# Patient Record
Sex: MALE | Race: BLACK OR AFRICAN AMERICAN | NOT HISPANIC OR LATINO
[De-identification: names, ages, dates, MRNs, and addresses within clinical notes are randomized per-mention and may not be internally consistent; named-entity substitution may affect disease eponyms.]

---

## 2017-01-12 ENCOUNTER — MEDICATION RENEWAL (OUTPATIENT)
Age: 82
End: 2017-01-12

## 2017-01-24 ENCOUNTER — APPOINTMENT (OUTPATIENT)
Dept: FAMILY MEDICINE | Facility: CLINIC | Age: 82
End: 2017-01-24

## 2017-01-24 ENCOUNTER — RESULT CHARGE (OUTPATIENT)
Age: 82
End: 2017-01-24

## 2017-01-24 VITALS
OXYGEN SATURATION: 95 % | SYSTOLIC BLOOD PRESSURE: 114 MMHG | DIASTOLIC BLOOD PRESSURE: 70 MMHG | HEIGHT: 72 IN | WEIGHT: 194 LBS | HEART RATE: 97 BPM | BODY MASS INDEX: 26.28 KG/M2 | TEMPERATURE: 97.9 F

## 2017-02-07 ENCOUNTER — MEDICATION RENEWAL (OUTPATIENT)
Age: 82
End: 2017-02-07

## 2017-02-07 LAB
25(OH)D3 SERPL-MCNC: 13 NG/ML
ALBUMIN SERPL ELPH-MCNC: 4 G/DL
ALP BLD-CCNC: 54 U/L
ALT SERPL-CCNC: 29 U/L
ANION GAP SERPL CALC-SCNC: 15 MMOL/L
AST SERPL-CCNC: 26 U/L
BILIRUB SERPL-MCNC: 0.3 MG/DL
BUN SERPL-MCNC: 18 MG/DL
CALCIUM SERPL-MCNC: 9.8 MG/DL
CHLORIDE SERPL-SCNC: 100 MMOL/L
CO2 SERPL-SCNC: 24 MMOL/L
CREAT SERPL-MCNC: 1.16 MG/DL
GLUCOSE SERPL-MCNC: 181 MG/DL
HBA1C MFR BLD HPLC: 7.8
INR PPP: 1.79 RATIO
POTASSIUM SERPL-SCNC: 4.1 MMOL/L
PROT SERPL-MCNC: 8.3 G/DL
PT BLD: 20.4 SEC
SODIUM SERPL-SCNC: 139 MMOL/L

## 2017-02-09 ENCOUNTER — APPOINTMENT (OUTPATIENT)
Dept: RADIOLOGY | Facility: CLINIC | Age: 82
End: 2017-02-09

## 2017-02-14 ENCOUNTER — APPOINTMENT (OUTPATIENT)
Dept: FAMILY MEDICINE | Facility: CLINIC | Age: 82
End: 2017-02-14

## 2017-02-14 ENCOUNTER — FORM ENCOUNTER (OUTPATIENT)
Age: 82
End: 2017-02-14

## 2017-02-14 VITALS
HEIGHT: 72 IN | WEIGHT: 198 LBS | BODY MASS INDEX: 26.82 KG/M2 | TEMPERATURE: 97 F | HEART RATE: 88 BPM | SYSTOLIC BLOOD PRESSURE: 164 MMHG | DIASTOLIC BLOOD PRESSURE: 78 MMHG | OXYGEN SATURATION: 95 %

## 2017-02-15 ENCOUNTER — APPOINTMENT (OUTPATIENT)
Dept: RADIOLOGY | Facility: CLINIC | Age: 82
End: 2017-02-15

## 2017-02-15 ENCOUNTER — OUTPATIENT (OUTPATIENT)
Dept: OUTPATIENT SERVICES | Facility: HOSPITAL | Age: 82
LOS: 1 days | End: 2017-02-15
Payer: MEDICARE

## 2017-02-15 DIAGNOSIS — Z00.8 ENCOUNTER FOR OTHER GENERAL EXAMINATION: ICD-10-CM

## 2017-02-15 PROCEDURE — 77080 DXA BONE DENSITY AXIAL: CPT

## 2017-02-16 LAB
INR PPP: 4.88 RATIO
PT BLD: 56 SEC

## 2017-02-23 ENCOUNTER — MEDICATION RENEWAL (OUTPATIENT)
Age: 82
End: 2017-02-23

## 2017-02-23 DIAGNOSIS — H91.92 UNSPECIFIED HEARING LOSS, LEFT EAR: ICD-10-CM

## 2017-02-23 RX ORDER — AMOXICILLIN 500 MG/1
500 TABLET, FILM COATED ORAL
Qty: 10 | Refills: 0 | Status: COMPLETED | COMMUNITY
Start: 2017-01-24 | End: 2017-02-23

## 2017-02-23 RX ORDER — GUAIFENESIN AND DEXTROMETHORPHAN HYDROBROMIDE 200; 10 MG/5ML; MG/5ML
10-200 LIQUID ORAL
Qty: 1 | Refills: 1 | Status: COMPLETED | COMMUNITY
Start: 2017-01-24 | End: 2017-02-23

## 2017-03-02 ENCOUNTER — APPOINTMENT (OUTPATIENT)
Dept: FAMILY MEDICINE | Facility: CLINIC | Age: 82
End: 2017-03-02

## 2017-03-02 VITALS
TEMPERATURE: 97 F | BODY MASS INDEX: 26.68 KG/M2 | WEIGHT: 197 LBS | SYSTOLIC BLOOD PRESSURE: 169 MMHG | DIASTOLIC BLOOD PRESSURE: 74 MMHG | HEIGHT: 72 IN | HEART RATE: 89 BPM | OXYGEN SATURATION: 95 %

## 2017-03-10 LAB
INR PPP: 1.55 RATIO
PT BLD: 17.6 SEC

## 2017-04-19 ENCOUNTER — MEDICATION RENEWAL (OUTPATIENT)
Age: 82
End: 2017-04-19

## 2017-05-12 ENCOUNTER — APPOINTMENT (OUTPATIENT)
Dept: FAMILY MEDICINE | Facility: CLINIC | Age: 82
End: 2017-05-12

## 2017-05-12 VITALS
DIASTOLIC BLOOD PRESSURE: 68 MMHG | BODY MASS INDEX: 26.45 KG/M2 | HEART RATE: 92 BPM | OXYGEN SATURATION: 94 % | SYSTOLIC BLOOD PRESSURE: 143 MMHG | WEIGHT: 195 LBS | TEMPERATURE: 98 F

## 2017-05-13 LAB
HBA1C MFR BLD HPLC: 7.9
INR PPP: 1.76 RATIO
PT BLD: 20.1 SEC

## 2017-06-19 ENCOUNTER — FORM ENCOUNTER (OUTPATIENT)
Age: 82
End: 2017-06-19

## 2017-06-19 ENCOUNTER — RESULT CHARGE (OUTPATIENT)
Age: 82
End: 2017-06-19

## 2017-06-20 ENCOUNTER — APPOINTMENT (OUTPATIENT)
Dept: RADIOLOGY | Facility: CLINIC | Age: 82
End: 2017-06-20

## 2017-06-20 ENCOUNTER — APPOINTMENT (OUTPATIENT)
Dept: FAMILY MEDICINE | Facility: CLINIC | Age: 82
End: 2017-06-20

## 2017-06-20 ENCOUNTER — OUTPATIENT (OUTPATIENT)
Dept: OUTPATIENT SERVICES | Facility: HOSPITAL | Age: 82
LOS: 1 days | End: 2017-06-20
Payer: MEDICARE

## 2017-06-20 VITALS
TEMPERATURE: 97.7 F | DIASTOLIC BLOOD PRESSURE: 84 MMHG | OXYGEN SATURATION: 95 % | SYSTOLIC BLOOD PRESSURE: 168 MMHG | WEIGHT: 191 LBS | HEIGHT: 72 IN | HEART RATE: 110 BPM | BODY MASS INDEX: 25.87 KG/M2

## 2017-06-20 DIAGNOSIS — J40 BRONCHITIS, NOT SPECIFIED AS ACUTE OR CHRONIC: ICD-10-CM

## 2017-06-20 PROCEDURE — 71020: CPT | Mod: 26

## 2017-06-20 PROCEDURE — 71046 X-RAY EXAM CHEST 2 VIEWS: CPT

## 2017-06-22 LAB
INR PPP: 1.38 RATIO
PT BLD: 15.7 SEC

## 2017-06-28 LAB
INR PPP: 2.89 RATIO
PT BLD: 33.4 SEC

## 2017-07-28 ENCOUNTER — MEDICATION RENEWAL (OUTPATIENT)
Age: 82
End: 2017-07-28

## 2017-08-01 ENCOUNTER — APPOINTMENT (OUTPATIENT)
Dept: FAMILY MEDICINE | Facility: CLINIC | Age: 82
End: 2017-08-01

## 2017-08-13 ENCOUNTER — RESULT CHARGE (OUTPATIENT)
Age: 82
End: 2017-08-13

## 2017-08-14 ENCOUNTER — APPOINTMENT (OUTPATIENT)
Dept: FAMILY MEDICINE | Facility: CLINIC | Age: 82
End: 2017-08-14
Payer: MEDICARE

## 2017-08-14 VITALS
BODY MASS INDEX: 25.19 KG/M2 | HEIGHT: 72 IN | OXYGEN SATURATION: 95 % | HEART RATE: 86 BPM | DIASTOLIC BLOOD PRESSURE: 63 MMHG | TEMPERATURE: 97.7 F | SYSTOLIC BLOOD PRESSURE: 130 MMHG | WEIGHT: 186 LBS

## 2017-08-14 DIAGNOSIS — M25.569 PAIN IN UNSPECIFIED KNEE: ICD-10-CM

## 2017-08-14 DIAGNOSIS — Z87.09 PERSONAL HISTORY OF OTHER DISEASES OF THE RESPIRATORY SYSTEM: ICD-10-CM

## 2017-08-14 DIAGNOSIS — Z92.29 PERSONAL HISTORY OF OTHER DRUG THERAPY: ICD-10-CM

## 2017-08-14 DIAGNOSIS — Z23 ENCOUNTER FOR IMMUNIZATION: ICD-10-CM

## 2017-08-14 DIAGNOSIS — J22 UNSPECIFIED ACUTE LOWER RESPIRATORY INFECTION: ICD-10-CM

## 2017-08-14 PROCEDURE — 36415 COLL VENOUS BLD VENIPUNCTURE: CPT

## 2017-08-14 PROCEDURE — 99213 OFFICE O/P EST LOW 20 MIN: CPT | Mod: 25

## 2017-08-14 PROCEDURE — 93000 ELECTROCARDIOGRAM COMPLETE: CPT

## 2017-08-14 RX ORDER — GUAIFENESIN AND DEXTROMETHORPHAN HYDROBROMIDE 200; 10 MG/5ML; MG/5ML
10-200 LIQUID ORAL
Qty: 1 | Refills: 1 | Status: COMPLETED | COMMUNITY
Start: 2017-06-20 | End: 2017-08-14

## 2017-08-14 RX ORDER — AZITHROMYCIN DIHYDRATE 250 MG/1
250 TABLET, FILM COATED ORAL
Qty: 1 | Refills: 0 | Status: COMPLETED | COMMUNITY
Start: 2017-06-20 | End: 2017-08-14

## 2017-08-15 LAB
BASOPHILS # BLD AUTO: 0.03 K/UL
BASOPHILS NFR BLD AUTO: 0.5 %
EOSINOPHIL # BLD AUTO: 0.2 K/UL
EOSINOPHIL NFR BLD AUTO: 3.1 %
HBA1C MFR BLD HPLC: 9.1
HCT VFR BLD CALC: 34.2 %
HGB BLD-MCNC: 11.2 G/DL
IMM GRANULOCYTES NFR BLD AUTO: 0.2 %
INR PPP: 3.3 RATIO
LYMPHOCYTES # BLD AUTO: 3.02 K/UL
LYMPHOCYTES NFR BLD AUTO: 46.2 %
MAN DIFF?: NORMAL
MCHC RBC-ENTMCNC: 28.6 PG
MCHC RBC-ENTMCNC: 32.7 GM/DL
MCV RBC AUTO: 87.2 FL
MONOCYTES # BLD AUTO: 0.54 K/UL
MONOCYTES NFR BLD AUTO: 8.3 %
NEUTROPHILS # BLD AUTO: 2.74 K/UL
NEUTROPHILS NFR BLD AUTO: 41.7 %
PLATELET # BLD AUTO: 232 K/UL
PT BLD: 38.2 SEC
RBC # BLD: 3.92 M/UL
RBC # FLD: 14.8 %
WBC # FLD AUTO: 6.54 K/UL

## 2017-09-05 ENCOUNTER — MEDICATION RENEWAL (OUTPATIENT)
Age: 82
End: 2017-09-05

## 2017-09-19 ENCOUNTER — MED ADMIN CHARGE (OUTPATIENT)
Age: 82
End: 2017-09-19

## 2017-09-19 ENCOUNTER — APPOINTMENT (OUTPATIENT)
Dept: FAMILY MEDICINE | Facility: CLINIC | Age: 82
End: 2017-09-19
Payer: MEDICARE

## 2017-09-19 VITALS
HEART RATE: 87 BPM | SYSTOLIC BLOOD PRESSURE: 168 MMHG | OXYGEN SATURATION: 96 % | DIASTOLIC BLOOD PRESSURE: 74 MMHG | TEMPERATURE: 98.1 F | BODY MASS INDEX: 25.19 KG/M2 | HEIGHT: 72 IN | WEIGHT: 186 LBS

## 2017-09-19 DIAGNOSIS — Z00.00 ENCOUNTER FOR GENERAL ADULT MEDICAL EXAMINATION W/OUT ABNORMAL FINDINGS: ICD-10-CM

## 2017-09-19 PROCEDURE — 90662 IIV NO PRSV INCREASED AG IM: CPT

## 2017-09-19 PROCEDURE — 93000 ELECTROCARDIOGRAM COMPLETE: CPT

## 2017-09-19 PROCEDURE — G0008: CPT

## 2017-09-19 PROCEDURE — G0439: CPT

## 2017-09-19 PROCEDURE — 36415 COLL VENOUS BLD VENIPUNCTURE: CPT

## 2017-09-19 RX ORDER — ACETAMINOPHEN 500 MG/1
500 TABLET ORAL TWICE DAILY
Qty: 1 | Refills: 2 | Status: COMPLETED | COMMUNITY
Start: 2017-05-12 | End: 2017-09-19

## 2017-09-20 LAB
25(OH)D3 SERPL-MCNC: 33.2 NG/ML
ALBUMIN SERPL ELPH-MCNC: 3.9 G/DL
ALP BLD-CCNC: 50 U/L
ALT SERPL-CCNC: 14 U/L
ANION GAP SERPL CALC-SCNC: 16 MMOL/L
APPEARANCE: CLEAR
AST SERPL-CCNC: 21 U/L
BACTERIA: NEGATIVE
BASOPHILS # BLD AUTO: 0.03 K/UL
BASOPHILS NFR BLD AUTO: 0.4 %
BILIRUB SERPL-MCNC: 0.2 MG/DL
BILIRUBIN URINE: NEGATIVE
BLOOD URINE: NEGATIVE
BUN SERPL-MCNC: 11 MG/DL
CALCIUM SERPL-MCNC: 10.1 MG/DL
CHLORIDE SERPL-SCNC: 100 MMOL/L
CHOLEST SERPL-MCNC: 185 MG/DL
CHOLEST/HDLC SERPL: 3.8 RATIO
CO2 SERPL-SCNC: 23 MMOL/L
COLOR: YELLOW
CREAT SERPL-MCNC: 1.03 MG/DL
CREAT SPEC-SCNC: 145 MG/DL
EOSINOPHIL # BLD AUTO: 0.3 K/UL
EOSINOPHIL NFR BLD AUTO: 4.4 %
GLUCOSE QUALITATIVE U: NORMAL MG/DL
GLUCOSE SERPL-MCNC: 184 MG/DL
HCT VFR BLD CALC: 34.3 %
HCV AB SER QL: NONREACTIVE
HCV S/CO RATIO: 0.31 S/CO
HDLC SERPL-MCNC: 49 MG/DL
HGB BLD-MCNC: 11.4 G/DL
HIV1+2 AB SPEC QL IA.RAPID: NONREACTIVE
HYALINE CASTS: 0 /LPF
IMM GRANULOCYTES NFR BLD AUTO: 0.1 %
INR PPP: 1.96 RATIO
KETONES URINE: NEGATIVE
LDLC SERPL CALC-MCNC: 108 MG/DL
LEUKOCYTE ESTERASE URINE: NEGATIVE
LYMPHOCYTES # BLD AUTO: 2.75 K/UL
LYMPHOCYTES NFR BLD AUTO: 40.6 %
MAN DIFF?: NORMAL
MCHC RBC-ENTMCNC: 28.9 PG
MCHC RBC-ENTMCNC: 33.2 GM/DL
MCV RBC AUTO: 86.8 FL
MICROALBUMIN 24H UR DL<=1MG/L-MCNC: 36.5 MG/DL
MICROALBUMIN/CREAT 24H UR-RTO: 252 MG/G
MICROSCOPIC-UA: NORMAL
MONOCYTES # BLD AUTO: 0.51 K/UL
MONOCYTES NFR BLD AUTO: 7.5 %
NEUTROPHILS # BLD AUTO: 3.18 K/UL
NEUTROPHILS NFR BLD AUTO: 47 %
NITRITE URINE: NEGATIVE
PH URINE: 6.5
PLATELET # BLD AUTO: 215 K/UL
POTASSIUM SERPL-SCNC: 4.1 MMOL/L
PROT SERPL-MCNC: 8.7 G/DL
PROTEIN URINE: 100 MG/DL
PSA SERPL-MCNC: 0.82 NG/ML
PT BLD: 22.5 SEC
RBC # BLD: 3.95 M/UL
RBC # FLD: 14.9 %
RED BLOOD CELLS URINE: 1 /HPF
SODIUM SERPL-SCNC: 139 MMOL/L
SPECIFIC GRAVITY URINE: 1.02
SQUAMOUS EPITHELIAL CELLS: 1 /HPF
TRIGL SERPL-MCNC: 142 MG/DL
TSH SERPL-ACNC: 1.61 UIU/ML
UROBILINOGEN URINE: NORMAL MG/DL
WBC # FLD AUTO: 6.78 K/UL
WHITE BLOOD CELLS URINE: 1 /HPF

## 2017-11-01 ENCOUNTER — MEDICATION RENEWAL (OUTPATIENT)
Age: 82
End: 2017-11-01

## 2017-12-21 ENCOUNTER — MEDICATION RENEWAL (OUTPATIENT)
Age: 82
End: 2017-12-21

## 2018-01-06 ENCOUNTER — MEDICATION RENEWAL (OUTPATIENT)
Age: 83
End: 2018-01-06

## 2018-01-06 ENCOUNTER — MOBILE ON CALL (OUTPATIENT)
Age: 83
End: 2018-01-06

## 2018-01-29 ENCOUNTER — RESULT CHARGE (OUTPATIENT)
Age: 83
End: 2018-01-29

## 2018-01-30 ENCOUNTER — APPOINTMENT (OUTPATIENT)
Dept: FAMILY MEDICINE | Facility: CLINIC | Age: 83
End: 2018-01-30
Payer: MEDICARE

## 2018-01-30 VITALS
OXYGEN SATURATION: 96 % | WEIGHT: 191 LBS | HEIGHT: 72 IN | TEMPERATURE: 97.8 F | SYSTOLIC BLOOD PRESSURE: 173 MMHG | BODY MASS INDEX: 25.87 KG/M2 | HEART RATE: 84 BPM | DIASTOLIC BLOOD PRESSURE: 78 MMHG

## 2018-01-30 DIAGNOSIS — R07.89 OTHER CHEST PAIN: ICD-10-CM

## 2018-01-30 DIAGNOSIS — N39.41 URGE INCONTINENCE: ICD-10-CM

## 2018-01-30 DIAGNOSIS — Z92.29 PERSONAL HISTORY OF OTHER DRUG THERAPY: ICD-10-CM

## 2018-01-30 PROCEDURE — 36415 COLL VENOUS BLD VENIPUNCTURE: CPT

## 2018-01-30 PROCEDURE — 99213 OFFICE O/P EST LOW 20 MIN: CPT | Mod: 25

## 2018-02-02 LAB
BASOPHILS # BLD AUTO: 0.04 K/UL
BASOPHILS NFR BLD AUTO: 0.5 %
EOSINOPHIL # BLD AUTO: 0.35 K/UL
EOSINOPHIL NFR BLD AUTO: 4.2 %
HBA1C MFR BLD HPLC: 7.8
HCT VFR BLD CALC: 35.2 %
HGB BLD-MCNC: 11.6 G/DL
IMM GRANULOCYTES NFR BLD AUTO: 0 %
INR PPP: 3.19 RATIO
LYMPHOCYTES # BLD AUTO: 3.94 K/UL
LYMPHOCYTES NFR BLD AUTO: 46.8 %
MAN DIFF?: NORMAL
MCHC RBC-ENTMCNC: 28.9 PG
MCHC RBC-ENTMCNC: 33 GM/DL
MCV RBC AUTO: 87.6 FL
MONOCYTES # BLD AUTO: 0.54 K/UL
MONOCYTES NFR BLD AUTO: 6.4 %
NEUTROPHILS # BLD AUTO: 3.55 K/UL
NEUTROPHILS NFR BLD AUTO: 42.1 %
PLATELET # BLD AUTO: 235 K/UL
PT BLD: 36.9 SEC
RBC # BLD: 4.02 M/UL
RBC # FLD: 14.5 %
WBC # FLD AUTO: 8.42 K/UL

## 2018-04-04 ENCOUNTER — RX RENEWAL (OUTPATIENT)
Age: 83
End: 2018-04-04

## 2018-04-30 ENCOUNTER — APPOINTMENT (OUTPATIENT)
Dept: FAMILY MEDICINE | Facility: CLINIC | Age: 83
End: 2018-04-30
Payer: MEDICARE

## 2018-04-30 ENCOUNTER — RESULT CHARGE (OUTPATIENT)
Age: 83
End: 2018-04-30

## 2018-04-30 VITALS
WEIGHT: 190 LBS | DIASTOLIC BLOOD PRESSURE: 73 MMHG | BODY MASS INDEX: 25.73 KG/M2 | HEIGHT: 72 IN | OXYGEN SATURATION: 92 % | HEART RATE: 89 BPM | TEMPERATURE: 98.2 F | SYSTOLIC BLOOD PRESSURE: 148 MMHG

## 2018-04-30 LAB — HBA1C MFR BLD HPLC: 8.1

## 2018-04-30 PROCEDURE — 99214 OFFICE O/P EST MOD 30 MIN: CPT

## 2018-04-30 RX ORDER — OMEPRAZOLE 40 MG/1
40 CAPSULE, DELAYED RELEASE ORAL
Qty: 30 | Refills: 0 | Status: COMPLETED | COMMUNITY
Start: 2017-08-14 | End: 2018-04-30

## 2018-05-01 LAB
25(OH)D3 SERPL-MCNC: 29.8 NG/ML
ALBUMIN SERPL ELPH-MCNC: 4 G/DL
ALP BLD-CCNC: 49 U/L
ALT SERPL-CCNC: 13 U/L
ANION GAP SERPL CALC-SCNC: 14 MMOL/L
AST SERPL-CCNC: 16 U/L
BILIRUB SERPL-MCNC: 0.2 MG/DL
BUN SERPL-MCNC: 22 MG/DL
CALCIUM SERPL-MCNC: 10.7 MG/DL
CHLORIDE SERPL-SCNC: 102 MMOL/L
CO2 SERPL-SCNC: 22 MMOL/L
CREAT SERPL-MCNC: 1.37 MG/DL
GLUCOSE SERPL-MCNC: 201 MG/DL
INR PPP: 2.3 RATIO
POTASSIUM SERPL-SCNC: 4.9 MMOL/L
PROT SERPL-MCNC: 8.4 G/DL
PT BLD: 26.4 SEC
SODIUM SERPL-SCNC: 138 MMOL/L

## 2018-06-13 ENCOUNTER — MEDICATION RENEWAL (OUTPATIENT)
Age: 83
End: 2018-06-13

## 2018-07-20 ENCOUNTER — RX RENEWAL (OUTPATIENT)
Age: 83
End: 2018-07-20

## 2018-07-20 ENCOUNTER — MEDICATION RENEWAL (OUTPATIENT)
Age: 83
End: 2018-07-20

## 2018-07-23 ENCOUNTER — RX RENEWAL (OUTPATIENT)
Age: 83
End: 2018-07-23

## 2018-07-30 ENCOUNTER — MED ADMIN CHARGE (OUTPATIENT)
Age: 83
End: 2018-07-30

## 2018-07-30 ENCOUNTER — RESULT CHARGE (OUTPATIENT)
Age: 83
End: 2018-07-30

## 2018-07-30 ENCOUNTER — APPOINTMENT (OUTPATIENT)
Dept: FAMILY MEDICINE | Facility: CLINIC | Age: 83
End: 2018-07-30
Payer: MEDICARE

## 2018-07-30 VITALS
HEIGHT: 72 IN | DIASTOLIC BLOOD PRESSURE: 70 MMHG | OXYGEN SATURATION: 95 % | BODY MASS INDEX: 25.19 KG/M2 | WEIGHT: 186 LBS | TEMPERATURE: 98.1 F | SYSTOLIC BLOOD PRESSURE: 137 MMHG | HEART RATE: 97 BPM

## 2018-07-30 DIAGNOSIS — Z13.89 ENCOUNTER FOR SCREENING FOR OTHER DISORDER: ICD-10-CM

## 2018-07-30 DIAGNOSIS — N52.9 MALE ERECTILE DYSFUNCTION, UNSPECIFIED: ICD-10-CM

## 2018-07-30 DIAGNOSIS — M85.80 OTHER SPECIFIED DISORDERS OF BONE DENSITY AND STRUCTURE, UNSPECIFIED SITE: ICD-10-CM

## 2018-07-30 DIAGNOSIS — E55.9 VITAMIN D DEFICIENCY, UNSPECIFIED: ICD-10-CM

## 2018-07-30 DIAGNOSIS — R79.89 OTHER SPECIFIED ABNORMAL FINDINGS OF BLOOD CHEMISTRY: ICD-10-CM

## 2018-07-30 DIAGNOSIS — R09.89 OTHER SPECIFIED SYMPTOMS AND SIGNS INVOLVING THE CIRCULATORY AND RESPIRATORY SYSTEMS: ICD-10-CM

## 2018-07-30 LAB — HBA1C MFR BLD HPLC: 7.5

## 2018-07-30 PROCEDURE — 99215 OFFICE O/P EST HI 40 MIN: CPT | Mod: 25

## 2018-07-30 PROCEDURE — G0009: CPT

## 2018-07-30 PROCEDURE — 36415 COLL VENOUS BLD VENIPUNCTURE: CPT

## 2018-07-30 PROCEDURE — 83036 HEMOGLOBIN GLYCOSYLATED A1C: CPT | Mod: QW

## 2018-07-30 PROCEDURE — 90732 PPSV23 VACC 2 YRS+ SUBQ/IM: CPT

## 2018-07-30 RX ORDER — ASPIRIN 325 MG
600-400 TABLET, DELAYED RELEASE (ENTERIC COATED) ORAL
Qty: 1 | Refills: 1 | Status: COMPLETED | COMMUNITY
Start: 2017-02-23 | End: 2018-07-30

## 2018-07-30 NOTE — HISTORY OF PRESENT ILLNESS
[FreeTextEntry1] : " I need prescription refills" [de-identified] : this is an 82-year old male with past medical history of paroxysmal  A. fib on anticoagulation (Coumadin), BPH, COPD, type 2 diabetes, erectile dysfunction, hypertension, osteopenia, vitamin D deficiency, presenting to the office for medication refills. Patient offers no acute complaints.

## 2018-07-30 NOTE — ASSESSMENT
[FreeTextEntry1] : this is an 82-her old male with past medical history of paroxysmal  A. fib on anticoagulation (Coumadin), BPH, COPD, type 2 diabetes, erectile dysfunction, hypertension, osteopenia, vitamin D deficiency, presenting to the office for medication refills.\par \par Cardiovascular: History hypertension, paroxysmal A. fib on Coumadin.\par -Will check PT/INR today.\par -Blood pressure under control on current medications, no changes made.\par -Continue amlodipine 5 mg once daily, continue with diltiazem 240 mg once a day, continue ramipril 5 mg once daily, continue warfarin 5 mg daily.\par -Diet and exercise as tolerated has been discussed with the patient.\par \par Endocrine: History of type 2 diabetes, vitamin D insufficiency.\par -Patient's last hemoglobin A1c 7.9. A1c in house today is 7.4\par -Will continue Glipizide 5 mg 2 tablets twice a day.\par -Continue metformin 1000 mg q.12 hours. Continue Onglyza 5 mg once daily.\par -Vitamin D insufficiency, will check vitamin D levels today.\par -Continue to check blood sugars at least once daily.\par \par Rheumatology: History of osteopenia.\par -Continue vitamin D supplementation, continue calcium supplements.\par \par : History of BPH, erectile dysfunction.\par -Continue tamsulosin 0.4 mg daily.\par -Continue Viagra 100 mg one tablet one hour prior to sexual activity.\par -Patient stopped Avodart on his own because he states it makes him feel worse when he takes it.\par \par Health care maintenance:\par -Last bone density done in February 2017.\par -Last colonoscopy screening as per patient October 2015 with no malignant lesions.\par -Patient received flu vaccine in September 2017.\par -Patient received Prevnar 13 in January 2017.\par -Patient received Pneumovax vaccine today.\par -Patient will return to the office in 3 months for a diabetes followup.

## 2018-07-30 NOTE — PLAN
[FreeTextEntry1] : visit checked and reviewed by supervising attending physician Dr Heide Harding M.D.

## 2018-07-30 NOTE — PHYSICAL EXAM
[No Acute Distress] : no acute distress [Well Nourished] : well nourished [Well Developed] : well developed [Well-Appearing] : well-appearing [No JVD] : no jugular venous distention [Supple] : supple [No Lymphadenopathy] : no lymphadenopathy [Thyroid Normal, No Nodules] : the thyroid was normal and there were no nodules present [No Respiratory Distress] : no respiratory distress  [Clear to Auscultation] : lungs were clear to auscultation bilaterally [No Accessory Muscle Use] : no accessory muscle use [Normal Rate] : normal rate  [Regular Rhythm] : with a regular rhythm [Normal S1, S2] : normal S1 and S2 [No Murmur] : no murmur heard [Soft] : abdomen soft [Non Tender] : non-tender [Normal Bowel Sounds] : normal bowel sounds

## 2018-07-31 LAB
ALBUMIN SERPL ELPH-MCNC: 4.1 G/DL
ALP BLD-CCNC: 48 U/L
ALT SERPL-CCNC: 16 U/L
ANION GAP SERPL CALC-SCNC: 15 MMOL/L
AST SERPL-CCNC: 17 U/L
BILIRUB SERPL-MCNC: 0.2 MG/DL
BUN SERPL-MCNC: 17 MG/DL
CALCIUM SERPL-MCNC: 9.8 MG/DL
CHLORIDE SERPL-SCNC: 104 MMOL/L
CO2 SERPL-SCNC: 22 MMOL/L
CREAT SERPL-MCNC: 1.17 MG/DL
GLUCOSE SERPL-MCNC: 77 MG/DL
INR PPP: 3.36 RATIO
POTASSIUM SERPL-SCNC: 4.4 MMOL/L
PROT SERPL-MCNC: 8.2 G/DL
PT BLD: 38.9 SEC
SODIUM SERPL-SCNC: 141 MMOL/L

## 2018-07-31 RX ORDER — SAXAGLIPTIN 5 MG/1
5 TABLET, FILM COATED ORAL
Qty: 30 | Refills: 3 | Status: DISCONTINUED | COMMUNITY
Start: 2017-08-14 | End: 2018-07-31

## 2018-09-03 ENCOUNTER — RESULT CHARGE (OUTPATIENT)
Age: 83
End: 2018-09-03

## 2018-09-04 ENCOUNTER — APPOINTMENT (OUTPATIENT)
Dept: FAMILY MEDICINE | Facility: CLINIC | Age: 83
End: 2018-09-04
Payer: MEDICARE

## 2018-09-04 VITALS
TEMPERATURE: 98.2 F | BODY MASS INDEX: 25.06 KG/M2 | WEIGHT: 185 LBS | SYSTOLIC BLOOD PRESSURE: 137 MMHG | OXYGEN SATURATION: 95 % | HEIGHT: 72 IN | HEART RATE: 93 BPM | DIASTOLIC BLOOD PRESSURE: 64 MMHG

## 2018-09-04 DIAGNOSIS — T14.8XXA OTHER INJURY OF UNSPECIFIED BODY REGION, INITIAL ENCOUNTER: ICD-10-CM

## 2018-09-04 DIAGNOSIS — Z23 ENCOUNTER FOR IMMUNIZATION: ICD-10-CM

## 2018-09-04 LAB — HBA1C MFR BLD HPLC: 7.2

## 2018-09-04 PROCEDURE — 36415 COLL VENOUS BLD VENIPUNCTURE: CPT

## 2018-09-04 PROCEDURE — 83036 HEMOGLOBIN GLYCOSYLATED A1C: CPT | Mod: QW

## 2018-09-04 PROCEDURE — 99214 OFFICE O/P EST MOD 30 MIN: CPT | Mod: 25

## 2018-09-04 RX ORDER — FLUTICASONE PROPIONATE AND SALMETEROL 50; 250 UG/1; UG/1
250-50 POWDER RESPIRATORY (INHALATION) TWICE DAILY
Qty: 1 | Refills: 3 | Status: ACTIVE | COMMUNITY
Start: 2018-01-30

## 2018-09-04 RX ORDER — ALBUTEROL SULFATE 2.5 MG/3ML
(2.5 MG/3ML) SOLUTION RESPIRATORY (INHALATION)
Qty: 1 | Refills: 3 | Status: ACTIVE | COMMUNITY
Start: 2018-01-30

## 2018-09-04 NOTE — HISTORY OF PRESENT ILLNESS
[FreeTextEntry1] : " I went to the emergency room " [de-identified] : patient presents to the office status post ER visit on August 30 secondary to hematoma of his left chest wall. Patient states that he was taking shower and felt a lump in his left chest wall, Flonase "considerable size black area "near his left nipple, patient decided to go to the emergency room. While in the emergency room he was found to have an INR of 4.71 and was told to stop Coumadin until he came to see his PMD. Patient denies any other bleeding areas, no spontaneous ecchymosis, denies any melena, hematochezia or epistaxis. Patient says he has not taken any Coumadin since then.

## 2018-09-04 NOTE — PHYSICAL EXAM
[No Acute Distress] : no acute distress [Well Nourished] : well nourished [Well Developed] : well developed [Well-Appearing] : well-appearing [No Respiratory Distress] : no respiratory distress  [Clear to Auscultation] : lungs were clear to auscultation bilaterally [No Accessory Muscle Use] : no accessory muscle use [Normal Rate] : normal rate  [Regular Rhythm] : with a regular rhythm [Normal S1, S2] : normal S1 and S2 [No Murmur] : no murmur heard [No Edema] : there was no peripheral edema [Soft] : abdomen soft [Non Tender] : non-tender [Normal Bowel Sounds] : normal bowel sounds [de-identified] : there is a pea size lump on the left breast at 4:00, nontender, there is a small area of ecchymosis in the same area. No signs of active bleeding

## 2018-09-04 NOTE — ASSESSMENT
[FreeTextEntry1] : this is an 82-her old male with past medical history of paroxysmal  A. fib on anticoagulation (Coumadin), BPH, COPD, type 2 diabetes, erectile dysfunction, hypertension, osteopenia, vitamin D deficiency, presenting to the office status post ER visit, found with supratherapeutic INR.\par \par Cardiovascular: History hypertension, paroxysmal A. fib on Coumadin, found with supratherapeutic INR.\par -Will check PT/INR today.\par -Patient currently taking 5 mg of Coumadin, he was asked to start 2.5 mg today, will call him tomorrow after INR results are obtained for appropriate dose.\par -Blood pressure under control on current medications, no changes made.\par -Continue amlodipine 5 mg once daily, continue with diltiazem 240 mg once a day, continue ramipril 5 mg once daily.\par -Diet and exercise as tolerated has been discussed with the patient.\par \par Endocrine: History of type 2 diabetes, vitamin D insufficiency.\par -Patient's last hemoglobin A1c 7.4. A1c in house today is 7.2\par -Will continue Glipizide 5 mg 2 tablets twice a day.\par -Continue metformin 1000 mg q.12 hours. Continue Onglyza 5 mg once daily.\par -Vitamin D insufficiency, will check vitamin D levels today.\par -Continue to check blood sugars at least once daily.\par \par Rheumatology: History of osteopenia.\par -Continue vitamin D supplementation, continue calcium supplements.\par \par : History of BPH, erectile dysfunction.\par -Continue tamsulosin 0.4 mg daily.\par -Continue Viagra 100 mg one tablet one hour prior to sexual activity.\par -Patient stopped Avodart on his own.\par \par Health care maintenance:\par -Last bone density done in February 2017.\par -Last colonoscopy screening as per patient October 2015 with no malignant lesions.\par -Patient received flu vaccine in September 2017.\par -Patient received Prevnar 13 in January 2017.\par -Patient received Pneumovax vaccine in July 2018.\par -Patient will return to the office on 10/29 for diabetes followup.

## 2018-09-05 LAB
INR PPP: 1.4 RATIO
PT BLD: 15.9 SEC

## 2018-09-10 ENCOUNTER — APPOINTMENT (OUTPATIENT)
Dept: FAMILY MEDICINE | Facility: CLINIC | Age: 83
End: 2018-09-10
Payer: MEDICARE

## 2018-09-10 PROCEDURE — 36415 COLL VENOUS BLD VENIPUNCTURE: CPT

## 2018-09-10 PROCEDURE — 99212 OFFICE O/P EST SF 10 MIN: CPT | Mod: 25

## 2018-09-10 NOTE — PHYSICAL EXAM
[No Acute Distress] : no acute distress [Well Nourished] : well nourished [Well Developed] : well developed [Well-Appearing] : well-appearing [No Respiratory Distress] : no respiratory distress  [Clear to Auscultation] : lungs were clear to auscultation bilaterally [No Accessory Muscle Use] : no accessory muscle use [Normal Rate] : normal rate  [Regular Rhythm] : with a regular rhythm [Normal S1, S2] : normal S1 and S2 [No Murmur] : no murmur heard

## 2018-09-10 NOTE — REVIEW OF SYSTEMS
[Abdominal Pain] : abdominal pain [Negative] : Gastrointestinal [Melena] : no melena [Hematuria] : no hematuria

## 2018-09-10 NOTE — ASSESSMENT
[FreeTextEntry1] : patient has been on alternating doses of 2.5 mg and 5 mg of Coumadin. We will check PT/INR today and adjust medication scheduled as needed.\par The patient will need to return to the office in 2 weeks for INR check.

## 2018-09-18 ENCOUNTER — RESULT CHARGE (OUTPATIENT)
Age: 83
End: 2018-09-18

## 2018-09-18 ENCOUNTER — APPOINTMENT (OUTPATIENT)
Dept: FAMILY MEDICINE | Facility: CLINIC | Age: 83
End: 2018-09-18
Payer: MEDICARE

## 2018-09-18 VITALS
TEMPERATURE: 98.6 F | WEIGHT: 185 LBS | DIASTOLIC BLOOD PRESSURE: 57 MMHG | HEIGHT: 72 IN | OXYGEN SATURATION: 95 % | BODY MASS INDEX: 25.06 KG/M2 | HEART RATE: 89 BPM | SYSTOLIC BLOOD PRESSURE: 148 MMHG

## 2018-09-18 DIAGNOSIS — Z23 ENCOUNTER FOR IMMUNIZATION: ICD-10-CM

## 2018-09-18 DIAGNOSIS — N39.41 URGE INCONTINENCE: ICD-10-CM

## 2018-09-18 LAB
INR PPP: 1.8 RATIO
PT BLD: 20.6 SEC

## 2018-09-18 PROCEDURE — 81003 URINALYSIS AUTO W/O SCOPE: CPT | Mod: QW

## 2018-09-18 PROCEDURE — 99215 OFFICE O/P EST HI 40 MIN: CPT | Mod: 25

## 2018-09-18 PROCEDURE — 90662 IIV NO PRSV INCREASED AG IM: CPT

## 2018-09-18 PROCEDURE — G0008: CPT

## 2018-09-18 PROCEDURE — 36415 COLL VENOUS BLD VENIPUNCTURE: CPT

## 2018-09-18 RX ORDER — ALBUTEROL SULFATE 90 UG/1
108 (90 BASE) AEROSOL, METERED RESPIRATORY (INHALATION) EVERY 4 HOURS
Qty: 1 | Refills: 0 | Status: ACTIVE | COMMUNITY
Start: 2018-09-18 | End: 1900-01-01

## 2018-09-18 NOTE — HEALTH RISK ASSESSMENT
[No falls in past year] : Patient reported no falls in the past year [0] : 2) Feeling down, depressed, or hopeless: Not at all (0) [] : No [de-identified] : + smoker 4 cigs/day

## 2018-09-18 NOTE — PHYSICAL EXAM
[No Acute Distress] : no acute distress [Well Nourished] : well nourished [Well Developed] : well developed [Well-Appearing] : well-appearing [No Respiratory Distress] : no respiratory distress  [Clear to Auscultation] : lungs were clear to auscultation bilaterally [No Accessory Muscle Use] : no accessory muscle use [Normal Rate] : normal rate  [Regular Rhythm] : with a regular rhythm [Normal S1, S2] : normal S1 and S2 [No Murmur] : no murmur heard [Soft] : abdomen soft [Non Tender] : non-tender [Normal Bowel Sounds] : normal bowel sounds

## 2018-09-18 NOTE — ASSESSMENT
[FreeTextEntry1] : this is an 82-her old male with past medical history of paroxysmal  A. fib on anticoagulation (Coumadin), BPH, COPD, type 2 diabetes, erectile dysfunction, hypertension, osteopenia, vitamin D deficiency, presenting for INR check and complaining of urinary urgency and foul urine smell.\par \par Cardiovascular: History hypertension, paroxysmal A. fib on Coumadin.\par -Will check PT/INR today.\par -Patient currently taking 5 mg of Coumadin daily for one week now\par -Last INR 1.80.\par -Blood pressure under control on current medications, no changes made.\par -Continue amlodipine 5 mg once daily, continue with diltiazem 240 mg once a day, continue ramipril 5 mg once daily.\par -Diet and exercise as tolerated has been discussed with the patient.\par \par Endocrine: History of type 2 diabetes, vitamin D insufficiency.\par -Patient's last hemoglobin A1c 7.2\par -Continue Glipizide 5 mg 2 tablets twice a day.\par -Continue metformin 1000 mg q.12 hours. Continue Onglyza 5 mg once daily.\par -Continue vitamin C supplementation.\par -Continue to check blood sugars at least once daily.\par \par Rheumatology: History of osteopenia, rheumatoid arthritis.\par -Continue vitamin D supplementation, continue calcium supplements.\par \par : History of BPH, erectile dysfunction,c/o urinary frequency and foul smelling urine.\par -UA showed trace Ketones, no Leukocyte, no nitrites, no blood.\par -Advise to increment fluid intake.\par -Continue tamsulosin 0.4 mg daily.\par -Continue Viagra 100 mg one tablet one hour prior to sexual activity.\par -Patient stopped Avodart on his own.\par -Will consider OAB medication if symptoms continue.\par \par Pulmonary: h/o COPD\par -Smoking cessation discussed, pt not ready.\par -Continue Advair 250 mcg bid, albuterol Nebs bid\par -Rx for Pro-air was sent to pharmacy\par \par Health care maintenance:\par -Last bone density done in February 2017.\par -Last colonoscopy screening as per patient October 2015 with no malignant lesions.\par -Patient will receive high-dose flu vaccine today.\par -Patient received Prevnar 13 in January 2017.\par -Patient received Pneumovax vaccine in July 2018.\par -Patient will return to the office on 10/29 for diabetes followup.

## 2018-09-18 NOTE — REVIEW OF SYSTEMS
[Dysuria] : dysuria [Incontinence] : incontinence [Hesitancy] : hesitancy [Nocturia] : nocturia [Frequency] : frequency [Negative] : Gastrointestinal [Diarrhea] : no diarrhea [Melena] : no melena [Hematuria] : no hematuria

## 2018-09-18 NOTE — HISTORY OF PRESENT ILLNESS
[FreeTextEntry8] : Pt presents complaining of 1 week h/o foul smelling urine, hesitancy, some burning on urination at times, urinary incontinence. Pt denies any fevers or lower back pain. Pt also presents to the office for PT/INR check s/p dose adjustment.

## 2018-09-21 LAB
BILIRUB UR QL STRIP: NORMAL
CLARITY UR: CLEAR
COLLECTION METHOD: NORMAL
GLUCOSE UR-MCNC: NORMAL
HCG UR QL: 0.2 EU/DL
HGB UR QL STRIP.AUTO: NORMAL
INR PPP: 2.02 RATIO
KETONES UR-MCNC: NORMAL
LEUKOCYTE ESTERASE UR QL STRIP: NORMAL
NITRITE UR QL STRIP: NORMAL
PH UR STRIP: 5.5
PROT UR STRIP-MCNC: 100
PT BLD: 23.1 SEC
SP GR UR STRIP: 1.02

## 2018-10-01 ENCOUNTER — APPOINTMENT (OUTPATIENT)
Dept: FAMILY MEDICINE | Facility: CLINIC | Age: 83
End: 2018-10-01
Payer: MEDICARE

## 2018-10-01 PROCEDURE — 99212 OFFICE O/P EST SF 10 MIN: CPT

## 2018-10-01 NOTE — HISTORY OF PRESENT ILLNESS
[FreeTextEntry1] : "I am here for my warfarin check " [de-identified] : Patient presents to the office requesting to have his INR checked, he had been subtherapeutic up until his last check on September 21 where his INR was 2.02. He has continued to take warfarin 5 mg daily. Patient denies any hematuria, melena, hematochezia.

## 2018-10-01 NOTE — HEALTH RISK ASSESSMENT
[No falls in past year] : Patient reported no falls in the past year [0] : 2) Feeling down, depressed, or hopeless: Not at all (0) [] : No [[id number]] : 0

## 2018-10-01 NOTE — PHYSICAL EXAM
[No Acute Distress] : no acute distress [Well Nourished] : well nourished [Well Developed] : well developed [Well-Appearing] : well-appearing [No Respiratory Distress] : no respiratory distress  [Clear to Auscultation] : lungs were clear to auscultation bilaterally [No Accessory Muscle Use] : no accessory muscle use [Normal Rate] : normal rate  [Regular Rhythm] : with a regular rhythm [Normal S1, S2] : normal S1 and S2 [No Murmur] : no murmur heard [Soft] : abdomen soft [Non Tender] : non-tender [Normal Bowel Sounds] : normal bowel sounds [de-identified] : No ecchymosis

## 2018-10-02 LAB
INR PPP: 1.79 RATIO
PT BLD: 20.5 SEC

## 2018-10-16 ENCOUNTER — APPOINTMENT (OUTPATIENT)
Dept: FAMILY MEDICINE | Facility: CLINIC | Age: 83
End: 2018-10-16
Payer: MEDICARE

## 2018-10-16 VITALS
TEMPERATURE: 97.5 F | WEIGHT: 185 LBS | HEART RATE: 87 BPM | BODY MASS INDEX: 25.06 KG/M2 | OXYGEN SATURATION: 91 % | DIASTOLIC BLOOD PRESSURE: 61 MMHG | HEIGHT: 72 IN | SYSTOLIC BLOOD PRESSURE: 172 MMHG

## 2018-10-16 DIAGNOSIS — N34.3 URETHRAL SYNDROME, UNSPECIFIED: ICD-10-CM

## 2018-10-16 PROCEDURE — 99213 OFFICE O/P EST LOW 20 MIN: CPT | Mod: 25

## 2018-10-16 PROCEDURE — 36415 COLL VENOUS BLD VENIPUNCTURE: CPT

## 2018-10-17 LAB
INR PPP: 2.13 RATIO
PT BLD: 24.4 SEC

## 2018-10-18 PROBLEM — N34.3 DYSURIA-FREQUENCY SYNDROME: Status: ACTIVE | Noted: 2018-09-18

## 2018-10-19 NOTE — HISTORY OF PRESENT ILLNESS
[FreeTextEntry1] : " I am here for Coumadin check" [de-identified] : The patient presents to the office for a coumadin check and prescription refills, no acute complaints at this time.

## 2018-10-19 NOTE — ASSESSMENT
[FreeTextEntry1] : this is an 82-her old male with past medical history of paroxysmal  A. fib on anticoagulation (Coumadin), BPH, COPD, type 2 diabetes, erectile dysfunction, hypertension, osteopenia, vitamin D deficiency, presenting for INR check and prescription refills.\par \par Cardiovascular: History hypertension, paroxysmal A. fib on Coumadin.\par -Will check PT/INR today.\par -Patient currently taking 5 mg of Coumadin daily.\par -Last INR 1.79.\par -Blood pressure under control on current medications, no changes made.\par -Continue amlodipine 5 mg once daily, continue with diltiazem 240 mg once a day, continue ramipril 5 mg once daily.\par -Diet and exercise as tolerated has been discussed with the patient.\par \par Endocrine: History of type 2 diabetes, vitamin D insufficiency.\par -Patient's last hemoglobin A1c 7.2\par -Continue Glipizide 5 mg 2 tablets twice a day.\par -Continue metformin 1000 mg q.12 hours. Continue Onglyza 5 mg once daily.\par -Continue vitamin C supplementation.\par -Continue to check blood sugars at least once daily.\par \par : History of BPH, erectile dysfunction.\par -Continue tamsulosin 0.4 mg daily.\par -Continue Viagra 100 mg one tablet one hour prior to sexual activity.\par -Patient stopped Avodart on his own.\par -Started Vesicare 5 mg for possible OAB.\par \par Health care maintenance:\par -Last bone density done in February 2017.\par -Last colonoscopy screening as per patient October 2015 with no malignant lesions.\par -Patient will receive high-dose flu vaccine today.\par -Patient received Prevnar 13 in January 2017.\par -Patient received Pneumovax vaccine in July 2018.\par -Patient will return to the office on 10/29 for diabetes followup.

## 2018-10-19 NOTE — HEALTH RISK ASSESSMENT
[No falls in past year] : Patient reported no falls in the past year [0] : 2) Feeling down, depressed, or hopeless: Not at all (0) [] : No [[id number]] : 0

## 2018-10-19 NOTE — PLAN
[FreeTextEntry1] : \par \par Case discussed with and reviewed by supervising attending Dr Heide Harding M.D.

## 2018-10-19 NOTE — PHYSICAL EXAM
[No Acute Distress] : no acute distress [Well Nourished] : well nourished [Well Developed] : well developed [Well-Appearing] : well-appearing [No Respiratory Distress] : no respiratory distress  [Clear to Auscultation] : lungs were clear to auscultation bilaterally [No Accessory Muscle Use] : no accessory muscle use [Normal Rate] : normal rate  [Regular Rhythm] : with a regular rhythm [Normal S1, S2] : normal S1 and S2 [No Murmur] : no murmur heard [No Edema] : there was no peripheral edema [Soft] : abdomen soft [Normal Bowel Sounds] : normal bowel sounds [Normal Gait] : normal gait [Coordination Grossly Intact] : coordination grossly intact

## 2018-10-25 ENCOUNTER — APPOINTMENT (OUTPATIENT)
Dept: FAMILY MEDICINE | Facility: CLINIC | Age: 83
End: 2018-10-25
Payer: MEDICARE

## 2018-10-25 VITALS
HEART RATE: 94 BPM | WEIGHT: 185 LBS | HEIGHT: 72 IN | TEMPERATURE: 98 F | SYSTOLIC BLOOD PRESSURE: 185 MMHG | OXYGEN SATURATION: 95 % | DIASTOLIC BLOOD PRESSURE: 89 MMHG | BODY MASS INDEX: 25.06 KG/M2

## 2018-10-25 DIAGNOSIS — N40.1 BENIGN PROSTATIC HYPERPLASIA WITH LOWER URINARY TRACT SYMPMS: ICD-10-CM

## 2018-10-25 DIAGNOSIS — N32.81 OVERACTIVE BLADDER: ICD-10-CM

## 2018-10-25 DIAGNOSIS — R35.1 BENIGN PROSTATIC HYPERPLASIA WITH LOWER URINARY TRACT SYMPMS: ICD-10-CM

## 2018-10-25 PROCEDURE — 99212 OFFICE O/P EST SF 10 MIN: CPT

## 2018-10-25 RX ORDER — FINASTERIDE 1 MG/1
1 TABLET ORAL
Qty: 30 | Refills: 0 | Status: ACTIVE | COMMUNITY
Start: 2018-10-25 | End: 1900-01-01

## 2018-10-25 NOTE — HISTORY OF PRESENT ILLNESS
[FreeTextEntry1] : " My new medication is too expensive" [de-identified] : Pt had been prescribed Vesicare for OAB symptoms but too expensive. Pt continues to complain about 2-3 x nocturia and daytime  frequency. Pt already on Flomax 0.4 mg daily.

## 2018-10-25 NOTE — ASSESSMENT
[FreeTextEntry1] : All other OAB medications are too expensive, will start him on finasteride 1 mg and watch for effectiveness, otherwise he will be sent for urology evaluation OAB vs BPH. May need pre-post void Bladder US.

## 2018-10-31 ENCOUNTER — APPOINTMENT (OUTPATIENT)
Dept: FAMILY MEDICINE | Facility: CLINIC | Age: 83
End: 2018-10-31

## 2019-01-01 ENCOUNTER — APPOINTMENT (OUTPATIENT)
Dept: ORTHOPEDIC SURGERY | Facility: CLINIC | Age: 84
End: 2019-01-01
Payer: MEDICARE

## 2019-01-01 ENCOUNTER — TRANSCRIPTION ENCOUNTER (OUTPATIENT)
Age: 84
End: 2019-01-01

## 2019-01-01 ENCOUNTER — RX RENEWAL (OUTPATIENT)
Age: 84
End: 2019-01-01

## 2019-01-01 ENCOUNTER — MEDICATION RENEWAL (OUTPATIENT)
Age: 84
End: 2019-01-01

## 2019-01-01 ENCOUNTER — APPOINTMENT (OUTPATIENT)
Dept: ORTHOPEDIC SURGERY | Facility: CLINIC | Age: 84
End: 2019-01-01
Payer: COMMERCIAL

## 2019-01-01 ENCOUNTER — OUTPATIENT (OUTPATIENT)
Dept: OUTPATIENT SERVICES | Facility: HOSPITAL | Age: 84
LOS: 1 days | End: 2019-01-01
Payer: MEDICARE

## 2019-01-01 ENCOUNTER — APPOINTMENT (OUTPATIENT)
Dept: RADIOLOGY | Facility: CLINIC | Age: 84
End: 2019-01-01

## 2019-01-01 ENCOUNTER — APPOINTMENT (OUTPATIENT)
Dept: FAMILY MEDICINE | Facility: CLINIC | Age: 84
End: 2019-01-01
Payer: MEDICARE

## 2019-01-01 ENCOUNTER — EMERGENCY (EMERGENCY)
Facility: HOSPITAL | Age: 84
LOS: 1 days | Discharge: HOME | End: 2019-01-01
Attending: EMERGENCY MEDICINE
Payer: MEDICARE

## 2019-01-01 ENCOUNTER — INBOUND DOCUMENT (OUTPATIENT)
Age: 84
End: 2019-01-01

## 2019-01-01 ENCOUNTER — INPATIENT (INPATIENT)
Facility: HOSPITAL | Age: 84
LOS: 6 days | Discharge: NURSING, REHAB OR LONG-TERM CARE | DRG: 552 | End: 2019-12-04
Attending: SURGERY | Admitting: SURGERY
Payer: MEDICARE

## 2019-01-01 ENCOUNTER — OTHER (OUTPATIENT)
Age: 84
End: 2019-01-01

## 2019-01-01 ENCOUNTER — FORM ENCOUNTER (OUTPATIENT)
Age: 84
End: 2019-01-01

## 2019-01-01 ENCOUNTER — INPATIENT (INPATIENT)
Facility: HOSPITAL | Age: 84
LOS: 4 days | Discharge: NURSING, REHAB OR LONG-TERM CARE | DRG: 69 | End: 2019-11-08
Attending: INTERNAL MEDICINE | Admitting: HOSPITALIST
Payer: MEDICARE

## 2019-01-01 ENCOUNTER — APPOINTMENT (OUTPATIENT)
Dept: NEUROSURGERY | Facility: CLINIC | Age: 84
End: 2019-01-01
Payer: MEDICARE

## 2019-01-01 VITALS
SYSTOLIC BLOOD PRESSURE: 133 MMHG | TEMPERATURE: 99 F | OXYGEN SATURATION: 98 % | RESPIRATION RATE: 18 BRPM | HEART RATE: 93 BPM | DIASTOLIC BLOOD PRESSURE: 74 MMHG

## 2019-01-01 VITALS
DIASTOLIC BLOOD PRESSURE: 75 MMHG | OXYGEN SATURATION: 97 % | TEMPERATURE: 98 F | SYSTOLIC BLOOD PRESSURE: 129 MMHG | RESPIRATION RATE: 18 BRPM | HEART RATE: 90 BPM

## 2019-01-01 VITALS
HEIGHT: 72 IN | BODY MASS INDEX: 24.38 KG/M2 | DIASTOLIC BLOOD PRESSURE: 80 MMHG | SYSTOLIC BLOOD PRESSURE: 150 MMHG | OXYGEN SATURATION: 98 % | HEART RATE: 121 BPM | WEIGHT: 180 LBS

## 2019-01-01 VITALS
HEIGHT: 71 IN | DIASTOLIC BLOOD PRESSURE: 87 MMHG | WEIGHT: 190.04 LBS | OXYGEN SATURATION: 94 % | TEMPERATURE: 98 F | HEART RATE: 104 BPM | SYSTOLIC BLOOD PRESSURE: 163 MMHG | RESPIRATION RATE: 20 BRPM

## 2019-01-01 VITALS
WEIGHT: 180 LBS | HEIGHT: 72 IN | HEART RATE: 105 BPM | BODY MASS INDEX: 24.38 KG/M2 | SYSTOLIC BLOOD PRESSURE: 120 MMHG | DIASTOLIC BLOOD PRESSURE: 72 MMHG

## 2019-01-01 VITALS
OXYGEN SATURATION: 98 % | HEART RATE: 87 BPM | DIASTOLIC BLOOD PRESSURE: 65 MMHG | TEMPERATURE: 98 F | SYSTOLIC BLOOD PRESSURE: 132 MMHG | RESPIRATION RATE: 16 BRPM

## 2019-01-01 VITALS
WEIGHT: 180 LBS | SYSTOLIC BLOOD PRESSURE: 166 MMHG | HEART RATE: 89 BPM | HEIGHT: 72 IN | DIASTOLIC BLOOD PRESSURE: 87 MMHG | BODY MASS INDEX: 24.38 KG/M2

## 2019-01-01 VITALS
HEART RATE: 87 BPM | TEMPERATURE: 98 F | DIASTOLIC BLOOD PRESSURE: 69 MMHG | OXYGEN SATURATION: 93 % | SYSTOLIC BLOOD PRESSURE: 114 MMHG | RESPIRATION RATE: 18 BRPM

## 2019-01-01 VITALS
BODY MASS INDEX: 24.38 KG/M2 | DIASTOLIC BLOOD PRESSURE: 75 MMHG | WEIGHT: 180 LBS | TEMPERATURE: 98 F | OXYGEN SATURATION: 95 % | HEIGHT: 72 IN | SYSTOLIC BLOOD PRESSURE: 146 MMHG | HEART RATE: 98 BPM

## 2019-01-01 VITALS
DIASTOLIC BLOOD PRESSURE: 81 MMHG | SYSTOLIC BLOOD PRESSURE: 148 MMHG | HEART RATE: 105 BPM | OXYGEN SATURATION: 95 % | TEMPERATURE: 98 F | RESPIRATION RATE: 16 BRPM

## 2019-01-01 DIAGNOSIS — J43.9 EMPHYSEMA, UNSPECIFIED: ICD-10-CM

## 2019-01-01 DIAGNOSIS — G45.9 TRANSIENT CEREBRAL ISCHEMIC ATTACK, UNSPECIFIED: ICD-10-CM

## 2019-01-01 DIAGNOSIS — R07.89 OTHER CHEST PAIN: ICD-10-CM

## 2019-01-01 DIAGNOSIS — Z29.9 ENCOUNTER FOR PROPHYLACTIC MEASURES, UNSPECIFIED: ICD-10-CM

## 2019-01-01 DIAGNOSIS — E11.69 TYPE 2 DIABETES MELLITUS WITH OTHER SPECIFIED COMPLICATION: ICD-10-CM
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
## 2019-01-01 DIAGNOSIS — G93.9 DISORDER OF BRAIN, UNSPECIFIED: ICD-10-CM

## 2019-01-01 DIAGNOSIS — S32.030D WEDGE COMPRESSION FRACTURE OF THIRD LUMBAR VERTEBRA, SUBSEQUENT ENCOUNTER FOR FRACTURE WITH ROUTINE HEALING: ICD-10-CM

## 2019-01-01 DIAGNOSIS — N40.0 BENIGN PROSTATIC HYPERPLASIA WITHOUT LOWER URINARY TRACT SYMPTOMS: ICD-10-CM

## 2019-01-01 DIAGNOSIS — M25.40 EFFUSION, UNSPECIFIED JOINT: ICD-10-CM

## 2019-01-01 DIAGNOSIS — M54.5 LOW BACK PAIN: ICD-10-CM

## 2019-01-01 DIAGNOSIS — I10 ESSENTIAL (PRIMARY) HYPERTENSION: ICD-10-CM

## 2019-01-01 DIAGNOSIS — M06.9 RHEUMATOID ARTHRITIS, UNSPECIFIED: ICD-10-CM

## 2019-01-01 DIAGNOSIS — I67.4 HYPERTENSIVE ENCEPHALOPATHY: ICD-10-CM

## 2019-01-01 DIAGNOSIS — Z72.0 TOBACCO USE: ICD-10-CM

## 2019-01-01 DIAGNOSIS — M17.12 UNILATERAL PRIMARY OSTEOARTHRITIS, LEFT KNEE: ICD-10-CM

## 2019-01-01 DIAGNOSIS — S32.030A WEDGE COMPRESSION FRACTURE OF THIRD LUMBAR VERTEBRA, INITIAL ENCOUNTER FOR CLOSED FRACTURE: ICD-10-CM

## 2019-01-01 DIAGNOSIS — J44.9 CHRONIC OBSTRUCTIVE PULMONARY DISEASE, UNSPECIFIED: Chronic | ICD-10-CM

## 2019-01-01 DIAGNOSIS — I48.0 PAROXYSMAL ATRIAL FIBRILLATION: ICD-10-CM

## 2019-01-01 DIAGNOSIS — M25.461 EFFUSION, RIGHT KNEE: ICD-10-CM

## 2019-01-01 LAB
ALBUMIN SERPL ELPH-MCNC: 3.6 G/DL — SIGNIFICANT CHANGE UP (ref 3.3–5.2)
ALBUMIN SERPL ELPH-MCNC: 3.7 G/DL — SIGNIFICANT CHANGE UP (ref 3.3–5.2)
ALBUMIN SERPL ELPH-MCNC: 3.9 G/DL — SIGNIFICANT CHANGE UP (ref 3.3–5.2)
ALP SERPL-CCNC: 49 U/L — SIGNIFICANT CHANGE UP (ref 40–120)
ALP SERPL-CCNC: 52 U/L — SIGNIFICANT CHANGE UP (ref 40–120)
ALP SERPL-CCNC: 56 U/L — SIGNIFICANT CHANGE UP (ref 40–120)
ALT FLD-CCNC: 11 U/L — SIGNIFICANT CHANGE UP
ALT FLD-CCNC: 17 U/L — SIGNIFICANT CHANGE UP
ALT FLD-CCNC: 23 U/L — SIGNIFICANT CHANGE UP
AMMONIA BLD-MCNC: 13 UMOL/L — SIGNIFICANT CHANGE UP (ref 11–55)
ANION GAP SERPL CALC-SCNC: 12 MMOL/L — SIGNIFICANT CHANGE UP (ref 5–17)
ANION GAP SERPL CALC-SCNC: 13 MMOL/L — SIGNIFICANT CHANGE UP (ref 5–17)
ANION GAP SERPL CALC-SCNC: 14 MMOL/L — SIGNIFICANT CHANGE UP (ref 5–17)
ANION GAP SERPL CALC-SCNC: 18 MMOL/L — HIGH (ref 5–17)
APPEARANCE UR: CLEAR — SIGNIFICANT CHANGE UP
APPEARANCE UR: CLEAR — SIGNIFICANT CHANGE UP
APTT BLD: 27.7 SEC — SIGNIFICANT CHANGE UP (ref 27.5–36.3)
APTT BLD: 29.9 SEC — SIGNIFICANT CHANGE UP (ref 27.5–36.3)
APTT BLD: 31.5 SEC — SIGNIFICANT CHANGE UP (ref 27.5–36.3)
APTT BLD: 34.9 SEC — SIGNIFICANT CHANGE UP (ref 27.5–36.3)
APTT BLD: 37.3 SEC — HIGH (ref 27.5–36.3)
APTT BLD: 37.7 SEC — HIGH (ref 27.5–36.3)
APTT BLD: 38.6 SEC — HIGH (ref 27.5–36.3)
APTT BLD: 38.8 SEC — HIGH (ref 27.5–36.3)
APTT BLD: 40.1 SEC — HIGH (ref 27.5–36.3)
AST SERPL-CCNC: 17 U/L — SIGNIFICANT CHANGE UP
AST SERPL-CCNC: 17 U/L — SIGNIFICANT CHANGE UP
AST SERPL-CCNC: 24 U/L — SIGNIFICANT CHANGE UP
B PERT IGG+IGM PNL SER: CLEAR
BACTERIA # UR AUTO: ABNORMAL
BACTERIA FLD CULT: NORMAL
BASE EXCESS BLDA CALC-SCNC: 3.2 MMOL/L — HIGH (ref -2–2)
BASOPHILS # BLD AUTO: 0.01 K/UL — SIGNIFICANT CHANGE UP (ref 0–0.2)
BASOPHILS # BLD AUTO: 0.02 K/UL — SIGNIFICANT CHANGE UP (ref 0–0.2)
BASOPHILS # BLD AUTO: 0.03 K/UL — SIGNIFICANT CHANGE UP (ref 0–0.2)
BASOPHILS # BLD AUTO: 0.04 K/UL — SIGNIFICANT CHANGE UP (ref 0–0.2)
BASOPHILS NFR BLD AUTO: 0.1 % — SIGNIFICANT CHANGE UP (ref 0–2)
BASOPHILS NFR BLD AUTO: 0.2 % — SIGNIFICANT CHANGE UP (ref 0–2)
BASOPHILS NFR BLD AUTO: 0.4 % — SIGNIFICANT CHANGE UP (ref 0–2)
BILIRUB SERPL-MCNC: 0.3 MG/DL — LOW (ref 0.4–2)
BILIRUB SERPL-MCNC: 0.3 MG/DL — LOW (ref 0.4–2)
BILIRUB SERPL-MCNC: <0.2 MG/DL — LOW (ref 0.4–2)
BILIRUB UR-MCNC: NEGATIVE — SIGNIFICANT CHANGE UP
BILIRUB UR-MCNC: NEGATIVE — SIGNIFICANT CHANGE UP
BLD GP AB SCN SERPL QL: SIGNIFICANT CHANGE UP
BLOOD GAS COMMENTS ARTERIAL: SIGNIFICANT CHANGE UP
BUN SERPL-MCNC: 13 MG/DL — SIGNIFICANT CHANGE UP (ref 8–20)
BUN SERPL-MCNC: 13 MG/DL — SIGNIFICANT CHANGE UP (ref 8–20)
BUN SERPL-MCNC: 16 MG/DL — SIGNIFICANT CHANGE UP (ref 8–20)
BUN SERPL-MCNC: 19 MG/DL — SIGNIFICANT CHANGE UP (ref 8–20)
BUN SERPL-MCNC: 19 MG/DL — SIGNIFICANT CHANGE UP (ref 8–20)
BUN SERPL-MCNC: 20 MG/DL — SIGNIFICANT CHANGE UP (ref 8–20)
BUN SERPL-MCNC: 23 MG/DL — HIGH (ref 8–20)
BUN SERPL-MCNC: 24 MG/DL — HIGH (ref 8–20)
BUN SERPL-MCNC: 26 MG/DL — HIGH (ref 8–20)
BUN SERPL-MCNC: 31 MG/DL — HIGH (ref 8–20)
BUN SERPL-MCNC: 33 MG/DL — HIGH (ref 8–20)
BUN SERPL-MCNC: 35 MG/DL — HIGH (ref 8–20)
BUN SERPL-MCNC: 46 MG/DL — HIGH (ref 8–20)
BUN SERPL-MCNC: 55 MG/DL — HIGH (ref 8–20)
CALCIUM SERPL-MCNC: 10 MG/DL — SIGNIFICANT CHANGE UP (ref 8.6–10.2)
CALCIUM SERPL-MCNC: 10.1 MG/DL — SIGNIFICANT CHANGE UP (ref 8.6–10.2)
CALCIUM SERPL-MCNC: 8.6 MG/DL — SIGNIFICANT CHANGE UP (ref 8.6–10.2)
CALCIUM SERPL-MCNC: 8.8 MG/DL — SIGNIFICANT CHANGE UP (ref 8.6–10.2)
CALCIUM SERPL-MCNC: 8.9 MG/DL — SIGNIFICANT CHANGE UP (ref 8.6–10.2)
CALCIUM SERPL-MCNC: 8.9 MG/DL — SIGNIFICANT CHANGE UP (ref 8.6–10.2)
CALCIUM SERPL-MCNC: 9.2 MG/DL — SIGNIFICANT CHANGE UP (ref 8.6–10.2)
CALCIUM SERPL-MCNC: 9.4 MG/DL — SIGNIFICANT CHANGE UP (ref 8.6–10.2)
CALCIUM SERPL-MCNC: 9.5 MG/DL — SIGNIFICANT CHANGE UP (ref 8.6–10.2)
CALCIUM SERPL-MCNC: 9.5 MG/DL — SIGNIFICANT CHANGE UP (ref 8.6–10.2)
CALCIUM SERPL-MCNC: 9.7 MG/DL — SIGNIFICANT CHANGE UP (ref 8.6–10.2)
CALCIUM SERPL-MCNC: 9.8 MG/DL — SIGNIFICANT CHANGE UP (ref 8.6–10.2)
CHLORIDE SERPL-SCNC: 100 MMOL/L — SIGNIFICANT CHANGE UP (ref 98–107)
CHLORIDE SERPL-SCNC: 102 MMOL/L — SIGNIFICANT CHANGE UP (ref 98–107)
CHLORIDE SERPL-SCNC: 103 MMOL/L — SIGNIFICANT CHANGE UP (ref 98–107)
CHLORIDE SERPL-SCNC: 103 MMOL/L — SIGNIFICANT CHANGE UP (ref 98–107)
CHLORIDE SERPL-SCNC: 104 MMOL/L — SIGNIFICANT CHANGE UP (ref 98–107)
CHLORIDE SERPL-SCNC: 104 MMOL/L — SIGNIFICANT CHANGE UP (ref 98–107)
CHLORIDE SERPL-SCNC: 105 MMOL/L — SIGNIFICANT CHANGE UP (ref 98–107)
CHLORIDE SERPL-SCNC: 106 MMOL/L — SIGNIFICANT CHANGE UP (ref 98–107)
CHLORIDE SERPL-SCNC: 107 MMOL/L — SIGNIFICANT CHANGE UP (ref 98–107)
CHLORIDE SERPL-SCNC: 108 MMOL/L — HIGH (ref 98–107)
CHLORIDE SERPL-SCNC: 99 MMOL/L — SIGNIFICANT CHANGE UP (ref 98–107)
CHLORIDE SERPL-SCNC: 99 MMOL/L — SIGNIFICANT CHANGE UP (ref 98–107)
CHOLEST SERPL-MCNC: 186 MG/DL — SIGNIFICANT CHANGE UP (ref 110–199)
CO2 SERPL-SCNC: 20 MMOL/L — LOW (ref 22–29)
CO2 SERPL-SCNC: 20 MMOL/L — LOW (ref 22–29)
CO2 SERPL-SCNC: 21 MMOL/L — LOW (ref 22–29)
CO2 SERPL-SCNC: 22 MMOL/L — SIGNIFICANT CHANGE UP (ref 22–29)
CO2 SERPL-SCNC: 23 MMOL/L — SIGNIFICANT CHANGE UP (ref 22–29)
CO2 SERPL-SCNC: 23 MMOL/L — SIGNIFICANT CHANGE UP (ref 22–29)
CO2 SERPL-SCNC: 24 MMOL/L — SIGNIFICANT CHANGE UP (ref 22–29)
CO2 SERPL-SCNC: 25 MMOL/L — SIGNIFICANT CHANGE UP (ref 22–29)
COLOR FLD: YELLOW
COLOR SPEC: SIGNIFICANT CHANGE UP
COLOR SPEC: YELLOW — SIGNIFICANT CHANGE UP
CREAT ?TM UR-MCNC: 75 MG/DL — SIGNIFICANT CHANGE UP
CREAT SERPL-MCNC: 0.83 MG/DL — SIGNIFICANT CHANGE UP (ref 0.5–1.3)
CREAT SERPL-MCNC: 0.86 MG/DL — SIGNIFICANT CHANGE UP (ref 0.5–1.3)
CREAT SERPL-MCNC: 0.91 MG/DL — SIGNIFICANT CHANGE UP (ref 0.5–1.3)
CREAT SERPL-MCNC: 0.94 MG/DL — SIGNIFICANT CHANGE UP (ref 0.5–1.3)
CREAT SERPL-MCNC: 1.06 MG/DL — SIGNIFICANT CHANGE UP (ref 0.5–1.3)
CREAT SERPL-MCNC: 1.08 MG/DL — SIGNIFICANT CHANGE UP (ref 0.5–1.3)
CREAT SERPL-MCNC: 1.08 MG/DL — SIGNIFICANT CHANGE UP (ref 0.5–1.3)
CREAT SERPL-MCNC: 1.1 MG/DL — SIGNIFICANT CHANGE UP (ref 0.5–1.3)
CREAT SERPL-MCNC: 1.18 MG/DL — SIGNIFICANT CHANGE UP (ref 0.5–1.3)
CREAT SERPL-MCNC: 1.21 MG/DL — SIGNIFICANT CHANGE UP (ref 0.5–1.3)
CREAT SERPL-MCNC: 1.22 MG/DL — SIGNIFICANT CHANGE UP (ref 0.5–1.3)
CREAT SERPL-MCNC: 1.26 MG/DL — SIGNIFICANT CHANGE UP (ref 0.5–1.3)
CREAT SERPL-MCNC: 1.73 MG/DL — HIGH (ref 0.5–1.3)
CREAT SERPL-MCNC: 1.86 MG/DL — HIGH (ref 0.5–1.3)
CULTURE RESULTS: NO GROWTH — SIGNIFICANT CHANGE UP
DIFF PNL FLD: ABNORMAL
DIFF PNL FLD: NEGATIVE — SIGNIFICANT CHANGE UP
EOSINOPHIL # BLD AUTO: 0.01 K/UL — SIGNIFICANT CHANGE UP (ref 0–0.5)
EOSINOPHIL # BLD AUTO: 0.04 K/UL — SIGNIFICANT CHANGE UP (ref 0–0.5)
EOSINOPHIL # BLD AUTO: 0.08 K/UL — SIGNIFICANT CHANGE UP (ref 0–0.5)
EOSINOPHIL # BLD AUTO: 0.09 K/UL — SIGNIFICANT CHANGE UP (ref 0–0.5)
EOSINOPHIL # BLD AUTO: 0.09 K/UL — SIGNIFICANT CHANGE UP (ref 0–0.5)
EOSINOPHIL # BLD AUTO: 0.1 K/UL — SIGNIFICANT CHANGE UP (ref 0–0.5)
EOSINOPHIL # BLD AUTO: 0.24 K/UL — SIGNIFICANT CHANGE UP (ref 0–0.5)
EOSINOPHIL NFR BLD AUTO: 0.1 % — SIGNIFICANT CHANGE UP (ref 0–6)
EOSINOPHIL NFR BLD AUTO: 0.4 % — SIGNIFICANT CHANGE UP (ref 0–6)
EOSINOPHIL NFR BLD AUTO: 0.9 % — SIGNIFICANT CHANGE UP (ref 0–6)
EOSINOPHIL NFR BLD AUTO: 1 % — SIGNIFICANT CHANGE UP (ref 0–6)
EOSINOPHIL NFR BLD AUTO: 1.1 % — SIGNIFICANT CHANGE UP (ref 0–6)
EOSINOPHIL NFR BLD AUTO: 1.1 % — SIGNIFICANT CHANGE UP (ref 0–6)
EOSINOPHIL NFR BLD AUTO: 2.3 % — SIGNIFICANT CHANGE UP (ref 0–6)
EPI CELLS # UR: SIGNIFICANT CHANGE UP
ERYTHROCYTE [SEDIMENTATION RATE] IN BLOOD: 49 MM/HR — HIGH (ref 0–20)
ETHANOL SERPL-MCNC: <10 MG/DL — SIGNIFICANT CHANGE UP
FLUID INTAKE SUBSTANCE CLASS: NORMAL
GAS PNL BLDA: SIGNIFICANT CHANGE UP
GLUCOSE BLDC GLUCOMTR-MCNC: 104 MG/DL — HIGH (ref 70–99)
GLUCOSE BLDC GLUCOMTR-MCNC: 119 MG/DL — HIGH (ref 70–99)
GLUCOSE BLDC GLUCOMTR-MCNC: 124 MG/DL — HIGH (ref 70–99)
GLUCOSE BLDC GLUCOMTR-MCNC: 132 MG/DL — HIGH (ref 70–99)
GLUCOSE BLDC GLUCOMTR-MCNC: 139 MG/DL — HIGH (ref 70–99)
GLUCOSE BLDC GLUCOMTR-MCNC: 139 MG/DL — HIGH (ref 70–99)
GLUCOSE BLDC GLUCOMTR-MCNC: 141 MG/DL — HIGH (ref 70–99)
GLUCOSE BLDC GLUCOMTR-MCNC: 144 MG/DL — HIGH (ref 70–99)
GLUCOSE BLDC GLUCOMTR-MCNC: 148 MG/DL — HIGH (ref 70–99)
GLUCOSE BLDC GLUCOMTR-MCNC: 150 MG/DL — HIGH (ref 70–99)
GLUCOSE BLDC GLUCOMTR-MCNC: 153 MG/DL — HIGH (ref 70–99)
GLUCOSE BLDC GLUCOMTR-MCNC: 159 MG/DL — HIGH (ref 70–99)
GLUCOSE BLDC GLUCOMTR-MCNC: 162 MG/DL — HIGH (ref 70–99)
GLUCOSE BLDC GLUCOMTR-MCNC: 163 MG/DL — HIGH (ref 70–99)
GLUCOSE BLDC GLUCOMTR-MCNC: 165 MG/DL — HIGH (ref 70–99)
GLUCOSE BLDC GLUCOMTR-MCNC: 169 MG/DL — HIGH (ref 70–99)
GLUCOSE BLDC GLUCOMTR-MCNC: 169 MG/DL — HIGH (ref 70–99)
GLUCOSE BLDC GLUCOMTR-MCNC: 180 MG/DL — HIGH (ref 70–99)
GLUCOSE BLDC GLUCOMTR-MCNC: 180 MG/DL — HIGH (ref 70–99)
GLUCOSE BLDC GLUCOMTR-MCNC: 184 MG/DL — HIGH (ref 70–99)
GLUCOSE BLDC GLUCOMTR-MCNC: 185 MG/DL — HIGH (ref 70–99)
GLUCOSE BLDC GLUCOMTR-MCNC: 187 MG/DL — HIGH (ref 70–99)
GLUCOSE BLDC GLUCOMTR-MCNC: 187 MG/DL — HIGH (ref 70–99)
GLUCOSE BLDC GLUCOMTR-MCNC: 189 MG/DL — HIGH (ref 70–99)
GLUCOSE BLDC GLUCOMTR-MCNC: 192 MG/DL — HIGH (ref 70–99)
GLUCOSE BLDC GLUCOMTR-MCNC: 193 MG/DL — HIGH (ref 70–99)
GLUCOSE BLDC GLUCOMTR-MCNC: 197 MG/DL — HIGH (ref 70–99)
GLUCOSE BLDC GLUCOMTR-MCNC: 198 MG/DL — HIGH (ref 70–99)
GLUCOSE BLDC GLUCOMTR-MCNC: 200 MG/DL — HIGH (ref 70–99)
GLUCOSE BLDC GLUCOMTR-MCNC: 201 MG/DL — HIGH (ref 70–99)
GLUCOSE BLDC GLUCOMTR-MCNC: 202 MG/DL — HIGH (ref 70–99)
GLUCOSE BLDC GLUCOMTR-MCNC: 205 MG/DL — HIGH (ref 70–99)
GLUCOSE BLDC GLUCOMTR-MCNC: 207 MG/DL — HIGH (ref 70–99)
GLUCOSE BLDC GLUCOMTR-MCNC: 208 MG/DL — HIGH (ref 70–99)
GLUCOSE BLDC GLUCOMTR-MCNC: 210 MG/DL — HIGH (ref 70–99)
GLUCOSE BLDC GLUCOMTR-MCNC: 215 MG/DL — HIGH (ref 70–99)
GLUCOSE BLDC GLUCOMTR-MCNC: 217 MG/DL — HIGH (ref 70–99)
GLUCOSE BLDC GLUCOMTR-MCNC: 219 MG/DL — HIGH (ref 70–99)
GLUCOSE BLDC GLUCOMTR-MCNC: 222 MG/DL — HIGH (ref 70–99)
GLUCOSE BLDC GLUCOMTR-MCNC: 230 MG/DL — HIGH (ref 70–99)
GLUCOSE BLDC GLUCOMTR-MCNC: 232 MG/DL — HIGH (ref 70–99)
GLUCOSE BLDC GLUCOMTR-MCNC: 234 MG/DL — HIGH (ref 70–99)
GLUCOSE BLDC GLUCOMTR-MCNC: 237 MG/DL — HIGH (ref 70–99)
GLUCOSE BLDC GLUCOMTR-MCNC: 247 MG/DL — HIGH (ref 70–99)
GLUCOSE BLDC GLUCOMTR-MCNC: 249 MG/DL — HIGH (ref 70–99)
GLUCOSE BLDC GLUCOMTR-MCNC: 257 MG/DL — HIGH (ref 70–99)
GLUCOSE BLDC GLUCOMTR-MCNC: 262 MG/DL — HIGH (ref 70–99)
GLUCOSE BLDC GLUCOMTR-MCNC: 297 MG/DL — HIGH (ref 70–99)
GLUCOSE BLDC GLUCOMTR-MCNC: 354 MG/DL — HIGH (ref 70–99)
GLUCOSE SERPL-MCNC: 112 MG/DL — SIGNIFICANT CHANGE UP (ref 70–115)
GLUCOSE SERPL-MCNC: 161 MG/DL — HIGH (ref 70–115)
GLUCOSE SERPL-MCNC: 165 MG/DL — HIGH (ref 70–115)
GLUCOSE SERPL-MCNC: 183 MG/DL — HIGH (ref 70–115)
GLUCOSE SERPL-MCNC: 187 MG/DL — HIGH (ref 70–115)
GLUCOSE SERPL-MCNC: 189 MG/DL — HIGH (ref 70–115)
GLUCOSE SERPL-MCNC: 203 MG/DL — HIGH (ref 70–115)
GLUCOSE SERPL-MCNC: 205 MG/DL — HIGH (ref 70–115)
GLUCOSE SERPL-MCNC: 208 MG/DL — HIGH (ref 70–115)
GLUCOSE SERPL-MCNC: 215 MG/DL — HIGH (ref 70–115)
GLUCOSE SERPL-MCNC: 230 MG/DL — HIGH (ref 70–115)
GLUCOSE SERPL-MCNC: 237 MG/DL — HIGH (ref 70–115)
GLUCOSE SERPL-MCNC: 238 MG/DL — HIGH (ref 70–115)
GLUCOSE SERPL-MCNC: 244 MG/DL — HIGH (ref 70–115)
GLUCOSE UR QL: NEGATIVE MG/DL — SIGNIFICANT CHANGE UP
GLUCOSE UR QL: NEGATIVE MG/DL — SIGNIFICANT CHANGE UP
HAV IGM SER-ACNC: SIGNIFICANT CHANGE UP
HBA1C BLD-MCNC: 7.4 % — HIGH (ref 4–5.6)
HBA1C BLD-MCNC: 7.4 % — HIGH (ref 4–5.6)
HBA1C MFR BLD HPLC: 7
HBV CORE IGM SER-ACNC: SIGNIFICANT CHANGE UP
HBV SURFACE AB SER-ACNC: REACTIVE
HBV SURFACE AG SER-ACNC: SIGNIFICANT CHANGE UP
HCO3 BLDA-SCNC: 27 MMOL/L — HIGH (ref 20–26)
HCT VFR BLD CALC: 30.4 % — LOW (ref 39–50)
HCT VFR BLD CALC: 31.2 % — LOW (ref 39–50)
HCT VFR BLD CALC: 31.6 % — LOW (ref 39–50)
HCT VFR BLD CALC: 32.9 % — LOW (ref 39–50)
HCT VFR BLD CALC: 34.4 % — LOW (ref 39–50)
HCT VFR BLD CALC: 34.5 % — LOW (ref 39–50)
HCT VFR BLD CALC: 34.9 % — LOW (ref 39–50)
HCT VFR BLD CALC: 35.7 % — LOW (ref 39–50)
HCT VFR BLD CALC: 36.1 % — LOW (ref 39–50)
HCT VFR BLD CALC: 36.1 % — LOW (ref 39–50)
HCT VFR BLD CALC: 36.4 % — LOW (ref 39–50)
HCV AB S/CO SERPL IA: 0.15 S/CO — SIGNIFICANT CHANGE UP (ref 0–0.99)
HCV AB SERPL-IMP: SIGNIFICANT CHANGE UP
HDLC SERPL-MCNC: 44 MG/DL — SIGNIFICANT CHANGE UP
HGB BLD-MCNC: 10.2 G/DL — LOW (ref 13–17)
HGB BLD-MCNC: 10.3 G/DL — LOW (ref 13–17)
HGB BLD-MCNC: 10.4 G/DL — LOW (ref 13–17)
HGB BLD-MCNC: 10.9 G/DL — LOW (ref 13–17)
HGB BLD-MCNC: 11.2 G/DL — LOW (ref 13–17)
HGB BLD-MCNC: 11.2 G/DL — LOW (ref 13–17)
HGB BLD-MCNC: 11.6 G/DL — LOW (ref 13–17)
HGB BLD-MCNC: 11.7 G/DL — LOW (ref 13–17)
HGB BLD-MCNC: 11.7 G/DL — LOW (ref 13–17)
HGB BLD-MCNC: 11.9 G/DL — LOW (ref 13–17)
HGB BLD-MCNC: 9.8 G/DL — LOW (ref 13–17)
HOROWITZ INDEX BLDA+IHG-RTO: SIGNIFICANT CHANGE UP
IMM GRANULOCYTES NFR BLD AUTO: 0.3 % — SIGNIFICANT CHANGE UP (ref 0–1.5)
IMM GRANULOCYTES NFR BLD AUTO: 0.3 % — SIGNIFICANT CHANGE UP (ref 0–1.5)
IMM GRANULOCYTES NFR BLD AUTO: 0.4 % — SIGNIFICANT CHANGE UP (ref 0–1.5)
IMM GRANULOCYTES NFR BLD AUTO: 0.7 % — SIGNIFICANT CHANGE UP (ref 0–1.5)
IMM GRANULOCYTES NFR BLD AUTO: 1.9 % — HIGH (ref 0–1.5)
INR BLD: 1.62 RATIO — HIGH (ref 0.88–1.16)
INR BLD: 1.7 RATIO — HIGH (ref 0.88–1.16)
INR BLD: 1.79 RATIO — HIGH (ref 0.88–1.16)
INR BLD: 1.82 RATIO — HIGH (ref 0.88–1.16)
INR BLD: 2.02 RATIO — HIGH (ref 0.88–1.16)
INR BLD: 2.08 RATIO — HIGH (ref 0.88–1.16)
INR BLD: 2.12 RATIO — HIGH (ref 0.88–1.16)
INR BLD: 2.23 RATIO — HIGH (ref 0.88–1.16)
INR BLD: 2.37 RATIO — HIGH (ref 0.88–1.16)
INR BLD: 2.64 RATIO — HIGH (ref 0.88–1.16)
INR BLD: 2.75 RATIO — HIGH (ref 0.88–1.16)
INR BLD: 3.12 RATIO — HIGH (ref 0.88–1.16)
INR BLD: 3.91 RATIO — HIGH (ref 0.88–1.16)
INR BLD: 3.91 RATIO — HIGH (ref 0.88–1.16)
KETONES UR-MCNC: NEGATIVE — SIGNIFICANT CHANGE UP
KETONES UR-MCNC: NEGATIVE — SIGNIFICANT CHANGE UP
LEUKOCYTE ESTERASE UR-ACNC: NEGATIVE — SIGNIFICANT CHANGE UP
LEUKOCYTE ESTERASE UR-ACNC: NEGATIVE — SIGNIFICANT CHANGE UP
LIPID PNL WITH DIRECT LDL SERPL: 121 MG/DL — SIGNIFICANT CHANGE UP
LYMPHOCYTES # BLD AUTO: 1.19 K/UL — SIGNIFICANT CHANGE UP (ref 1–3.3)
LYMPHOCYTES # BLD AUTO: 1.36 K/UL — SIGNIFICANT CHANGE UP (ref 1–3.3)
LYMPHOCYTES # BLD AUTO: 1.37 K/UL — SIGNIFICANT CHANGE UP (ref 1–3.3)
LYMPHOCYTES # BLD AUTO: 1.39 K/UL — SIGNIFICANT CHANGE UP (ref 1–3.3)
LYMPHOCYTES # BLD AUTO: 1.93 K/UL — SIGNIFICANT CHANGE UP (ref 1–3.3)
LYMPHOCYTES # BLD AUTO: 15.2 % — SIGNIFICANT CHANGE UP (ref 13–44)
LYMPHOCYTES # BLD AUTO: 15.2 % — SIGNIFICANT CHANGE UP (ref 13–44)
LYMPHOCYTES # BLD AUTO: 16.5 % — SIGNIFICANT CHANGE UP (ref 13–44)
LYMPHOCYTES # BLD AUTO: 2.35 K/UL — SIGNIFICANT CHANGE UP (ref 1–3.3)
LYMPHOCYTES # BLD AUTO: 2.57 K/UL — SIGNIFICANT CHANGE UP (ref 1–3.3)
LYMPHOCYTES # BLD AUTO: 20.3 % — SIGNIFICANT CHANGE UP (ref 13–44)
LYMPHOCYTES # BLD AUTO: 22.2 % — SIGNIFICANT CHANGE UP (ref 13–44)
LYMPHOCYTES # BLD AUTO: 31 % — SIGNIFICANT CHANGE UP (ref 13–44)
LYMPHOCYTES # BLD AUTO: 7.8 % — LOW (ref 13–44)
LYMPHOCYTES # FLD MANUAL: 20 %
MAGNESIUM SERPL-MCNC: 1.5 MG/DL — LOW (ref 1.6–2.6)
MAGNESIUM SERPL-MCNC: 1.5 MG/DL — LOW (ref 1.6–2.6)
MAGNESIUM SERPL-MCNC: 1.9 MG/DL — SIGNIFICANT CHANGE UP (ref 1.6–2.6)
MAGNESIUM SERPL-MCNC: 2 MG/DL — SIGNIFICANT CHANGE UP (ref 1.6–2.6)
MCHC RBC-ENTMCNC: 28.5 PG — SIGNIFICANT CHANGE UP (ref 27–34)
MCHC RBC-ENTMCNC: 28.7 PG — SIGNIFICANT CHANGE UP (ref 27–34)
MCHC RBC-ENTMCNC: 28.7 PG — SIGNIFICANT CHANGE UP (ref 27–34)
MCHC RBC-ENTMCNC: 28.9 PG — SIGNIFICANT CHANGE UP (ref 27–34)
MCHC RBC-ENTMCNC: 28.9 PG — SIGNIFICANT CHANGE UP (ref 27–34)
MCHC RBC-ENTMCNC: 29.2 PG — SIGNIFICANT CHANGE UP (ref 27–34)
MCHC RBC-ENTMCNC: 29.3 PG — SIGNIFICANT CHANGE UP (ref 27–34)
MCHC RBC-ENTMCNC: 29.5 PG — SIGNIFICANT CHANGE UP (ref 27–34)
MCHC RBC-ENTMCNC: 29.7 PG — SIGNIFICANT CHANGE UP (ref 27–34)
MCHC RBC-ENTMCNC: 31.6 GM/DL — LOW (ref 32–36)
MCHC RBC-ENTMCNC: 31.7 GM/DL — LOW (ref 32–36)
MCHC RBC-ENTMCNC: 31.9 GM/DL — LOW (ref 32–36)
MCHC RBC-ENTMCNC: 32.1 GM/DL — SIGNIFICANT CHANGE UP (ref 32–36)
MCHC RBC-ENTMCNC: 32.2 GM/DL — SIGNIFICANT CHANGE UP (ref 32–36)
MCHC RBC-ENTMCNC: 32.4 GM/DL — SIGNIFICANT CHANGE UP (ref 32–36)
MCHC RBC-ENTMCNC: 32.5 GM/DL — SIGNIFICANT CHANGE UP (ref 32–36)
MCHC RBC-ENTMCNC: 32.6 GM/DL — SIGNIFICANT CHANGE UP (ref 32–36)
MCHC RBC-ENTMCNC: 32.7 GM/DL — SIGNIFICANT CHANGE UP (ref 32–36)
MCHC RBC-ENTMCNC: 32.8 GM/DL — SIGNIFICANT CHANGE UP (ref 32–36)
MCHC RBC-ENTMCNC: 33 GM/DL — SIGNIFICANT CHANGE UP (ref 32–36)
MCV RBC AUTO: 87.2 FL — SIGNIFICANT CHANGE UP (ref 80–100)
MCV RBC AUTO: 88.4 FL — SIGNIFICANT CHANGE UP (ref 80–100)
MCV RBC AUTO: 89.1 FL — SIGNIFICANT CHANGE UP (ref 80–100)
MCV RBC AUTO: 89.3 FL — SIGNIFICANT CHANGE UP (ref 80–100)
MCV RBC AUTO: 89.4 FL — SIGNIFICANT CHANGE UP (ref 80–100)
MCV RBC AUTO: 89.9 FL — SIGNIFICANT CHANGE UP (ref 80–100)
MCV RBC AUTO: 90.1 FL — SIGNIFICANT CHANGE UP (ref 80–100)
MCV RBC AUTO: 91.1 FL — SIGNIFICANT CHANGE UP (ref 80–100)
MCV RBC AUTO: 91.2 FL — SIGNIFICANT CHANGE UP (ref 80–100)
MONOCYTES # BLD AUTO: 0.62 K/UL — SIGNIFICANT CHANGE UP (ref 0–0.9)
MONOCYTES # BLD AUTO: 0.63 K/UL — SIGNIFICANT CHANGE UP (ref 0–0.9)
MONOCYTES # BLD AUTO: 0.7 K/UL — SIGNIFICANT CHANGE UP (ref 0–0.9)
MONOCYTES # BLD AUTO: 0.71 K/UL — SIGNIFICANT CHANGE UP (ref 0–0.9)
MONOCYTES # BLD AUTO: 0.71 K/UL — SIGNIFICANT CHANGE UP (ref 0–0.9)
MONOCYTES # BLD AUTO: 0.87 K/UL — SIGNIFICANT CHANGE UP (ref 0–0.9)
MONOCYTES # BLD AUTO: 1.25 K/UL — HIGH (ref 0–0.9)
MONOCYTES NFR BLD AUTO: 5.9 % — SIGNIFICANT CHANGE UP (ref 2–14)
MONOCYTES NFR BLD AUTO: 7.4 % — SIGNIFICANT CHANGE UP (ref 2–14)
MONOCYTES NFR BLD AUTO: 7.6 % — SIGNIFICANT CHANGE UP (ref 2–14)
MONOCYTES NFR BLD AUTO: 7.9 % — SIGNIFICANT CHANGE UP (ref 2–14)
MONOCYTES NFR BLD AUTO: 8.2 % — SIGNIFICANT CHANGE UP (ref 2–14)
MONOCYTES NFR BLD AUTO: 8.6 % — SIGNIFICANT CHANGE UP (ref 2–14)
MONOCYTES NFR BLD AUTO: 9.5 % — SIGNIFICANT CHANGE UP (ref 2–14)
MONOS+MACROS NFR FLD MANUAL: 70 %
MRSA PCR RESULT.: DETECTED
NEUTROPHILS # BLD AUTO: 12.65 K/UL — HIGH (ref 1.8–7.4)
NEUTROPHILS # BLD AUTO: 4.86 K/UL — SIGNIFICANT CHANGE UP (ref 1.8–7.4)
NEUTROPHILS # BLD AUTO: 6.13 K/UL — SIGNIFICANT CHANGE UP (ref 1.8–7.4)
NEUTROPHILS # BLD AUTO: 6.71 K/UL — SIGNIFICANT CHANGE UP (ref 1.8–7.4)
NEUTROPHILS # BLD AUTO: 6.71 K/UL — SIGNIFICANT CHANGE UP (ref 1.8–7.4)
NEUTROPHILS # BLD AUTO: 6.86 K/UL — SIGNIFICANT CHANGE UP (ref 1.8–7.4)
NEUTROPHILS # BLD AUTO: 7.15 K/UL — SIGNIFICANT CHANGE UP (ref 1.8–7.4)
NEUTROPHILS NFR BLD AUTO: 58.7 % — SIGNIFICANT CHANGE UP (ref 43–77)
NEUTROPHILS NFR BLD AUTO: 67.5 % — SIGNIFICANT CHANGE UP (ref 43–77)
NEUTROPHILS NFR BLD AUTO: 70.7 % — SIGNIFICANT CHANGE UP (ref 43–77)
NEUTROPHILS NFR BLD AUTO: 73.7 % — SIGNIFICANT CHANGE UP (ref 43–77)
NEUTROPHILS NFR BLD AUTO: 74.4 % — SIGNIFICANT CHANGE UP (ref 43–77)
NEUTROPHILS NFR BLD AUTO: 75.9 % — SIGNIFICANT CHANGE UP (ref 43–77)
NEUTROPHILS NFR BLD AUTO: 83 % — HIGH (ref 43–77)
NEUTS SEG # FLD MANUAL: 10 %
NITRITE UR-MCNC: NEGATIVE — SIGNIFICANT CHANGE UP
NITRITE UR-MCNC: NEGATIVE — SIGNIFICANT CHANGE UP
OSMOLALITY UR: 557 MOSM/KG — SIGNIFICANT CHANGE UP (ref 300–1000)
PCO2 BLDA: 42 MMHG — SIGNIFICANT CHANGE UP (ref 35–45)
PH BLDA: 7.44 — SIGNIFICANT CHANGE UP (ref 7.35–7.45)
PH UR: 6 — SIGNIFICANT CHANGE UP (ref 5–8)
PH UR: 7 — SIGNIFICANT CHANGE UP (ref 5–8)
PHOSPHATE SERPL-MCNC: 2 MG/DL — LOW (ref 2.4–4.7)
PHOSPHATE SERPL-MCNC: 2.4 MG/DL — SIGNIFICANT CHANGE UP (ref 2.4–4.7)
PHOSPHATE SERPL-MCNC: 2.9 MG/DL — SIGNIFICANT CHANGE UP (ref 2.4–4.7)
PHOSPHATE SERPL-MCNC: 3 MG/DL — SIGNIFICANT CHANGE UP (ref 2.4–4.7)
PHOSPHATE SERPL-MCNC: 3.4 MG/DL — SIGNIFICANT CHANGE UP (ref 2.4–4.7)
PHOSPHATE SERPL-MCNC: 3.6 MG/DL — SIGNIFICANT CHANGE UP (ref 2.4–4.7)
PLATELET # BLD AUTO: 152 K/UL — SIGNIFICANT CHANGE UP (ref 150–400)
PLATELET # BLD AUTO: 157 K/UL — SIGNIFICANT CHANGE UP (ref 150–400)
PLATELET # BLD AUTO: 161 K/UL — SIGNIFICANT CHANGE UP (ref 150–400)
PLATELET # BLD AUTO: 201 K/UL — SIGNIFICANT CHANGE UP (ref 150–400)
PLATELET # BLD AUTO: 206 K/UL — SIGNIFICANT CHANGE UP (ref 150–400)
PLATELET # BLD AUTO: 215 K/UL — SIGNIFICANT CHANGE UP (ref 150–400)
PLATELET # BLD AUTO: 217 K/UL — SIGNIFICANT CHANGE UP (ref 150–400)
PLATELET # BLD AUTO: 224 K/UL — SIGNIFICANT CHANGE UP (ref 150–400)
PLATELET # BLD AUTO: 224 K/UL — SIGNIFICANT CHANGE UP (ref 150–400)
PLATELET # BLD AUTO: 226 K/UL — SIGNIFICANT CHANGE UP (ref 150–400)
PLATELET # BLD AUTO: 271 K/UL — SIGNIFICANT CHANGE UP (ref 150–400)
PO2 BLDA: 60 MMHG — LOW (ref 83–108)
POTASSIUM SERPL-MCNC: 3.6 MMOL/L — SIGNIFICANT CHANGE UP (ref 3.5–5.3)
POTASSIUM SERPL-MCNC: 3.9 MMOL/L — SIGNIFICANT CHANGE UP (ref 3.5–5.3)
POTASSIUM SERPL-MCNC: 4.1 MMOL/L — SIGNIFICANT CHANGE UP (ref 3.5–5.3)
POTASSIUM SERPL-MCNC: 4.1 MMOL/L — SIGNIFICANT CHANGE UP (ref 3.5–5.3)
POTASSIUM SERPL-MCNC: 4.2 MMOL/L — SIGNIFICANT CHANGE UP (ref 3.5–5.3)
POTASSIUM SERPL-MCNC: 4.2 MMOL/L — SIGNIFICANT CHANGE UP (ref 3.5–5.3)
POTASSIUM SERPL-MCNC: 4.3 MMOL/L — SIGNIFICANT CHANGE UP (ref 3.5–5.3)
POTASSIUM SERPL-MCNC: 4.4 MMOL/L — SIGNIFICANT CHANGE UP (ref 3.5–5.3)
POTASSIUM SERPL-MCNC: 4.4 MMOL/L — SIGNIFICANT CHANGE UP (ref 3.5–5.3)
POTASSIUM SERPL-MCNC: 4.5 MMOL/L — SIGNIFICANT CHANGE UP (ref 3.5–5.3)
POTASSIUM SERPL-MCNC: 4.7 MMOL/L — SIGNIFICANT CHANGE UP (ref 3.5–5.3)
POTASSIUM SERPL-MCNC: 4.7 MMOL/L — SIGNIFICANT CHANGE UP (ref 3.5–5.3)
POTASSIUM SERPL-MCNC: 5.1 MMOL/L — SIGNIFICANT CHANGE UP (ref 3.5–5.3)
POTASSIUM SERPL-MCNC: 5.2 MMOL/L — SIGNIFICANT CHANGE UP (ref 3.5–5.3)
POTASSIUM SERPL-SCNC: 3.6 MMOL/L — SIGNIFICANT CHANGE UP (ref 3.5–5.3)
POTASSIUM SERPL-SCNC: 3.9 MMOL/L — SIGNIFICANT CHANGE UP (ref 3.5–5.3)
POTASSIUM SERPL-SCNC: 4.1 MMOL/L — SIGNIFICANT CHANGE UP (ref 3.5–5.3)
POTASSIUM SERPL-SCNC: 4.1 MMOL/L — SIGNIFICANT CHANGE UP (ref 3.5–5.3)
POTASSIUM SERPL-SCNC: 4.2 MMOL/L — SIGNIFICANT CHANGE UP (ref 3.5–5.3)
POTASSIUM SERPL-SCNC: 4.2 MMOL/L — SIGNIFICANT CHANGE UP (ref 3.5–5.3)
POTASSIUM SERPL-SCNC: 4.3 MMOL/L — SIGNIFICANT CHANGE UP (ref 3.5–5.3)
POTASSIUM SERPL-SCNC: 4.4 MMOL/L — SIGNIFICANT CHANGE UP (ref 3.5–5.3)
POTASSIUM SERPL-SCNC: 4.4 MMOL/L — SIGNIFICANT CHANGE UP (ref 3.5–5.3)
POTASSIUM SERPL-SCNC: 4.5 MMOL/L — SIGNIFICANT CHANGE UP (ref 3.5–5.3)
POTASSIUM SERPL-SCNC: 4.7 MMOL/L — SIGNIFICANT CHANGE UP (ref 3.5–5.3)
POTASSIUM SERPL-SCNC: 4.7 MMOL/L — SIGNIFICANT CHANGE UP (ref 3.5–5.3)
POTASSIUM SERPL-SCNC: 5.1 MMOL/L — SIGNIFICANT CHANGE UP (ref 3.5–5.3)
POTASSIUM SERPL-SCNC: 5.2 MMOL/L — SIGNIFICANT CHANGE UP (ref 3.5–5.3)
PROT SERPL-MCNC: 7.5 G/DL — SIGNIFICANT CHANGE UP (ref 6.6–8.7)
PROT SERPL-MCNC: 7.9 G/DL — SIGNIFICANT CHANGE UP (ref 6.6–8.7)
PROT SERPL-MCNC: 8.4 G/DL — SIGNIFICANT CHANGE UP (ref 6.6–8.7)
PROT UR-MCNC: 100 MG/DL
PROT UR-MCNC: 15 MG/DL
PROTHROM AB SERPL-ACNC: 18.9 SEC — HIGH (ref 10–12.9)
PROTHROM AB SERPL-ACNC: 19.9 SEC — HIGH (ref 10–12.9)
PROTHROM AB SERPL-ACNC: 20.9 SEC — HIGH (ref 10–12.9)
PROTHROM AB SERPL-ACNC: 21.3 SEC — HIGH (ref 10–12.9)
PROTHROM AB SERPL-ACNC: 23.7 SEC — HIGH (ref 10–12.9)
PROTHROM AB SERPL-ACNC: 24.5 SEC — HIGH (ref 10–12.9)
PROTHROM AB SERPL-ACNC: 25 SEC — HIGH (ref 10–12.9)
PROTHROM AB SERPL-ACNC: 26.3 SEC — HIGH (ref 10–12.9)
PROTHROM AB SERPL-ACNC: 28 SEC — HIGH (ref 10–12.9)
PROTHROM AB SERPL-ACNC: 31.3 SEC — HIGH (ref 10–12.9)
PROTHROM AB SERPL-ACNC: 32.6 SEC — HIGH (ref 10–12.9)
PROTHROM AB SERPL-ACNC: 37.2 SEC — HIGH (ref 10–12.9)
PROTHROM AB SERPL-ACNC: 46.9 SEC — HIGH (ref 10–12.9)
PROTHROM AB SERPL-ACNC: 46.9 SEC — HIGH (ref 10–12.9)
RBC # BLD: 3.41 M/UL — LOW (ref 4.2–5.8)
RBC # BLD: 3.47 M/UL — LOW (ref 4.2–5.8)
RBC # BLD: 3.53 M/UL — LOW (ref 4.2–5.8)
RBC # BLD: 3.65 M/UL — LOW (ref 4.2–5.8)
RBC # BLD: 3.82 M/UL — LOW (ref 4.2–5.8)
RBC # BLD: 3.83 M/UL — LOW (ref 4.2–5.8)
RBC # BLD: 3.88 M/UL — LOW (ref 4.2–5.8)
RBC # BLD: 3.96 M/UL — LOW (ref 4.2–5.8)
RBC # BLD: 4 M/UL — LOW (ref 4.2–5.8)
RBC # BLD: 4.07 M/UL — LOW (ref 4.2–5.8)
RBC # BLD: 4.14 M/UL — LOW (ref 4.2–5.8)
RBC # FLD MANUAL: 1000 /UL
RBC # FLD: 13.5 % — SIGNIFICANT CHANGE UP (ref 10.3–14.5)
RBC # FLD: 13.6 % — SIGNIFICANT CHANGE UP (ref 10.3–14.5)
RBC # FLD: 13.6 % — SIGNIFICANT CHANGE UP (ref 10.3–14.5)
RBC # FLD: 13.8 % — SIGNIFICANT CHANGE UP (ref 10.3–14.5)
RBC # FLD: 14 % — SIGNIFICANT CHANGE UP (ref 10.3–14.5)
RBC # FLD: 14.1 % — SIGNIFICANT CHANGE UP (ref 10.3–14.5)
RBC # FLD: 14.2 % — SIGNIFICANT CHANGE UP (ref 10.3–14.5)
RBC # FLD: 14.5 % — SIGNIFICANT CHANGE UP (ref 10.3–14.5)
RBC CASTS # UR COMP ASSIST: ABNORMAL /HPF (ref 0–4)
S AUREUS DNA NOSE QL NAA+PROBE: DETECTED
SAO2 % BLDA: 90 % — LOW (ref 95–99)
SODIUM SERPL-SCNC: 134 MMOL/L — LOW (ref 135–145)
SODIUM SERPL-SCNC: 135 MMOL/L — SIGNIFICANT CHANGE UP (ref 135–145)
SODIUM SERPL-SCNC: 137 MMOL/L — SIGNIFICANT CHANGE UP (ref 135–145)
SODIUM SERPL-SCNC: 139 MMOL/L — SIGNIFICANT CHANGE UP (ref 135–145)
SODIUM SERPL-SCNC: 139 MMOL/L — SIGNIFICANT CHANGE UP (ref 135–145)
SODIUM SERPL-SCNC: 140 MMOL/L — SIGNIFICANT CHANGE UP (ref 135–145)
SODIUM SERPL-SCNC: 140 MMOL/L — SIGNIFICANT CHANGE UP (ref 135–145)
SODIUM SERPL-SCNC: 141 MMOL/L — SIGNIFICANT CHANGE UP (ref 135–145)
SODIUM SERPL-SCNC: 142 MMOL/L — SIGNIFICANT CHANGE UP (ref 135–145)
SODIUM SERPL-SCNC: 144 MMOL/L — SIGNIFICANT CHANGE UP (ref 135–145)
SODIUM UR-SCNC: 101 MMOL/L — SIGNIFICANT CHANGE UP
SP GR SPEC: 1.01 — SIGNIFICANT CHANGE UP (ref 1.01–1.02)
SP GR SPEC: 1.01 — SIGNIFICANT CHANGE UP (ref 1.01–1.02)
SPECIMEN SOURCE: SIGNIFICANT CHANGE UP
SYCRY CLARITY: ABNORMAL
SYCRY COLOR: YELLOW
SYCRY ID: ABNORMAL
SYCRY TUBE: NORMAL
TOTAL CELLS COUNTED FLD: 250 /UL
TOTAL CHOLESTEROL/HDL RATIO MEASUREMENT: 4 RATIO — SIGNIFICANT CHANGE UP (ref 3.4–9.6)
TRIGL SERPL-MCNC: 104 MG/DL — SIGNIFICANT CHANGE UP (ref 10–200)
TUBE TYPE: NORMAL
UROBILINOGEN FLD QL: NEGATIVE MG/DL — SIGNIFICANT CHANGE UP
UROBILINOGEN FLD QL: NEGATIVE MG/DL — SIGNIFICANT CHANGE UP
WBC # BLD: 10.58 K/UL — HIGH (ref 3.8–10.5)
WBC # BLD: 15.24 K/UL — HIGH (ref 3.8–10.5)
WBC # BLD: 6.17 K/UL — SIGNIFICANT CHANGE UP (ref 3.8–10.5)
WBC # BLD: 7.12 K/UL — SIGNIFICANT CHANGE UP (ref 3.8–10.5)
WBC # BLD: 7.18 K/UL — SIGNIFICANT CHANGE UP (ref 3.8–10.5)
WBC # BLD: 8.24 K/UL — SIGNIFICANT CHANGE UP (ref 3.8–10.5)
WBC # BLD: 8.28 K/UL — SIGNIFICANT CHANGE UP (ref 3.8–10.5)
WBC # BLD: 8.39 K/UL — SIGNIFICANT CHANGE UP (ref 3.8–10.5)
WBC # BLD: 9.04 K/UL — SIGNIFICANT CHANGE UP (ref 3.8–10.5)
WBC # BLD: 9.12 K/UL — SIGNIFICANT CHANGE UP (ref 3.8–10.5)
WBC # BLD: 9.5 K/UL — SIGNIFICANT CHANGE UP (ref 3.8–10.5)
WBC # FLD AUTO: 10.58 K/UL — HIGH (ref 3.8–10.5)
WBC # FLD AUTO: 15.24 K/UL — HIGH (ref 3.8–10.5)
WBC # FLD AUTO: 6.17 K/UL — SIGNIFICANT CHANGE UP (ref 3.8–10.5)
WBC # FLD AUTO: 7.12 K/UL — SIGNIFICANT CHANGE UP (ref 3.8–10.5)
WBC # FLD AUTO: 7.18 K/UL — SIGNIFICANT CHANGE UP (ref 3.8–10.5)
WBC # FLD AUTO: 8.24 K/UL — SIGNIFICANT CHANGE UP (ref 3.8–10.5)
WBC # FLD AUTO: 8.28 K/UL — SIGNIFICANT CHANGE UP (ref 3.8–10.5)
WBC # FLD AUTO: 8.39 K/UL — SIGNIFICANT CHANGE UP (ref 3.8–10.5)
WBC # FLD AUTO: 9.04 K/UL — SIGNIFICANT CHANGE UP (ref 3.8–10.5)
WBC # FLD AUTO: 9.12 K/UL — SIGNIFICANT CHANGE UP (ref 3.8–10.5)
WBC # FLD AUTO: 9.5 K/UL — SIGNIFICANT CHANGE UP (ref 3.8–10.5)
WBC UR QL: SIGNIFICANT CHANGE UP

## 2019-01-01 PROCEDURE — 99232 SBSQ HOSP IP/OBS MODERATE 35: CPT

## 2019-01-01 PROCEDURE — 82962 GLUCOSE BLOOD TEST: CPT

## 2019-01-01 PROCEDURE — 71045 X-RAY EXAM CHEST 1 VIEW: CPT

## 2019-01-01 PROCEDURE — 97110 THERAPEUTIC EXERCISES: CPT

## 2019-01-01 PROCEDURE — 85027 COMPLETE CBC AUTOMATED: CPT

## 2019-01-01 PROCEDURE — 86706 HEP B SURFACE ANTIBODY: CPT

## 2019-01-01 PROCEDURE — 87086 URINE CULTURE/COLONY COUNT: CPT

## 2019-01-01 PROCEDURE — 82803 BLOOD GASES ANY COMBINATION: CPT

## 2019-01-01 PROCEDURE — 82140 ASSAY OF AMMONIA: CPT

## 2019-01-01 PROCEDURE — 84100 ASSAY OF PHOSPHORUS: CPT

## 2019-01-01 PROCEDURE — 36415 COLL VENOUS BLD VENIPUNCTURE: CPT

## 2019-01-01 PROCEDURE — 86850 RBC ANTIBODY SCREEN: CPT

## 2019-01-01 PROCEDURE — 84300 ASSAY OF URINE SODIUM: CPT

## 2019-01-01 PROCEDURE — 94640 AIRWAY INHALATION TREATMENT: CPT

## 2019-01-01 PROCEDURE — 97167 OT EVAL HIGH COMPLEX 60 MIN: CPT

## 2019-01-01 PROCEDURE — 83735 ASSAY OF MAGNESIUM: CPT

## 2019-01-01 PROCEDURE — 92526 ORAL FUNCTION THERAPY: CPT

## 2019-01-01 PROCEDURE — 86900 BLOOD TYPING SEROLOGIC ABO: CPT

## 2019-01-01 PROCEDURE — 85730 THROMBOPLASTIN TIME PARTIAL: CPT

## 2019-01-01 PROCEDURE — 70544 MR ANGIOGRAPHY HEAD W/O DYE: CPT | Mod: 26,59

## 2019-01-01 PROCEDURE — 99284 EMERGENCY DEPT VISIT MOD MDM: CPT

## 2019-01-01 PROCEDURE — 70552 MRI BRAIN STEM W/DYE: CPT

## 2019-01-01 PROCEDURE — 97116 GAIT TRAINING THERAPY: CPT

## 2019-01-01 PROCEDURE — 81001 URINALYSIS AUTO W/SCOPE: CPT

## 2019-01-01 PROCEDURE — 80048 BASIC METABOLIC PNL TOTAL CA: CPT

## 2019-01-01 PROCEDURE — 83036 HEMOGLOBIN GLYCOSYLATED A1C: CPT | Mod: QW

## 2019-01-01 PROCEDURE — 99223 1ST HOSP IP/OBS HIGH 75: CPT

## 2019-01-01 PROCEDURE — 74177 CT ABD & PELVIS W/CONTRAST: CPT | Mod: 26

## 2019-01-01 PROCEDURE — 72148 MRI LUMBAR SPINE W/O DYE: CPT

## 2019-01-01 PROCEDURE — 70548 MR ANGIOGRAPHY NECK W/DYE: CPT | Mod: 26

## 2019-01-01 PROCEDURE — 85610 PROTHROMBIN TIME: CPT

## 2019-01-01 PROCEDURE — 72128 CT CHEST SPINE W/O DYE: CPT | Mod: 26

## 2019-01-01 PROCEDURE — 96374 THER/PROPH/DIAG INJ IV PUSH: CPT | Mod: XU

## 2019-01-01 PROCEDURE — 12345: CPT | Mod: NC

## 2019-01-01 PROCEDURE — 20610 DRAIN/INJ JOINT/BURSA W/O US: CPT | Mod: LT

## 2019-01-01 PROCEDURE — 70544 MR ANGIOGRAPHY HEAD W/O DYE: CPT

## 2019-01-01 PROCEDURE — 99285 EMERGENCY DEPT VISIT HI MDM: CPT

## 2019-01-01 PROCEDURE — 72125 CT NECK SPINE W/O DYE: CPT | Mod: 26

## 2019-01-01 PROCEDURE — 99214 OFFICE O/P EST MOD 30 MIN: CPT | Mod: 25

## 2019-01-01 PROCEDURE — 71045 X-RAY EXAM CHEST 1 VIEW: CPT | Mod: 26

## 2019-01-01 PROCEDURE — 72131 CT LUMBAR SPINE W/O DYE: CPT | Mod: 26

## 2019-01-01 PROCEDURE — 70553 MRI BRAIN STEM W/O & W/DYE: CPT | Mod: 26

## 2019-01-01 PROCEDURE — 87640 STAPH A DNA AMP PROBE: CPT

## 2019-01-01 PROCEDURE — 97530 THERAPEUTIC ACTIVITIES: CPT

## 2019-01-01 PROCEDURE — 70450 CT HEAD/BRAIN W/O DYE: CPT | Mod: 26

## 2019-01-01 PROCEDURE — 70548 MR ANGIOGRAPHY NECK W/DYE: CPT

## 2019-01-01 PROCEDURE — 70450 CT HEAD/BRAIN W/O DYE: CPT

## 2019-01-01 PROCEDURE — 83605 ASSAY OF LACTIC ACID: CPT

## 2019-01-01 PROCEDURE — 80074 ACUTE HEPATITIS PANEL: CPT

## 2019-01-01 PROCEDURE — 71260 CT THORAX DX C+: CPT

## 2019-01-01 PROCEDURE — 73562 X-RAY EXAM OF KNEE 3: CPT | Mod: LT

## 2019-01-01 PROCEDURE — 99213 OFFICE O/P EST LOW 20 MIN: CPT | Mod: 25

## 2019-01-01 PROCEDURE — 85652 RBC SED RATE AUTOMATED: CPT

## 2019-01-01 PROCEDURE — 80053 COMPREHEN METABOLIC PANEL: CPT

## 2019-01-01 PROCEDURE — 92610 EVALUATE SWALLOWING FUNCTION: CPT

## 2019-01-01 PROCEDURE — 97163 PT EVAL HIGH COMPLEX 45 MIN: CPT

## 2019-01-01 PROCEDURE — 72125 CT NECK SPINE W/O DYE: CPT

## 2019-01-01 PROCEDURE — 99231 SBSQ HOSP IP/OBS SF/LOW 25: CPT

## 2019-01-01 PROCEDURE — 99204 OFFICE O/P NEW MOD 45 MIN: CPT | Mod: 25

## 2019-01-01 PROCEDURE — 99213 OFFICE O/P EST LOW 20 MIN: CPT

## 2019-01-01 PROCEDURE — 93005 ELECTROCARDIOGRAM TRACING: CPT

## 2019-01-01 PROCEDURE — 87641 MR-STAPH DNA AMP PROBE: CPT

## 2019-01-01 PROCEDURE — 71260 CT THORAX DX C+: CPT | Mod: 26

## 2019-01-01 PROCEDURE — 82570 ASSAY OF URINE CREATININE: CPT

## 2019-01-01 PROCEDURE — 86901 BLOOD TYPING SEROLOGIC RH(D): CPT

## 2019-01-01 PROCEDURE — 96372 THER/PROPH/DIAG INJ SC/IM: CPT

## 2019-01-01 PROCEDURE — 83935 ASSAY OF URINE OSMOLALITY: CPT

## 2019-01-01 PROCEDURE — 83036 HEMOGLOBIN GLYCOSYLATED A1C: CPT

## 2019-01-01 PROCEDURE — 71120 X-RAY EXAM BREASTBONE 2/>VWS: CPT | Mod: 26

## 2019-01-01 PROCEDURE — 93010 ELECTROCARDIOGRAM REPORT: CPT

## 2019-01-01 PROCEDURE — 72148 MRI LUMBAR SPINE W/O DYE: CPT | Mod: 26

## 2019-01-01 PROCEDURE — 99239 HOSP IP/OBS DSCHRG MGMT >30: CPT

## 2019-01-01 PROCEDURE — 71120 X-RAY EXAM BREASTBONE 2/>VWS: CPT

## 2019-01-01 PROCEDURE — 96375 TX/PRO/DX INJ NEW DRUG ADDON: CPT | Mod: XU

## 2019-01-01 PROCEDURE — 74177 CT ABD & PELVIS W/CONTRAST: CPT

## 2019-01-01 PROCEDURE — 80061 LIPID PANEL: CPT

## 2019-01-01 PROCEDURE — 99285 EMERGENCY DEPT VISIT HI MDM: CPT | Mod: 25

## 2019-01-01 PROCEDURE — 80307 DRUG TEST PRSMV CHEM ANLYZR: CPT

## 2019-01-01 PROCEDURE — 99222 1ST HOSP IP/OBS MODERATE 55: CPT

## 2019-01-01 RX ORDER — WARFARIN SODIUM 2.5 MG/1
10 TABLET ORAL AT BEDTIME
Refills: 0 | Status: DISCONTINUED | OUTPATIENT
Start: 2019-01-01 | End: 2019-01-01

## 2019-01-01 RX ORDER — ATORVASTATIN CALCIUM 80 MG/1
20 TABLET, FILM COATED ORAL AT BEDTIME
Refills: 0 | Status: DISCONTINUED | OUTPATIENT
Start: 2019-01-01 | End: 2019-01-01

## 2019-01-01 RX ORDER — KETOROLAC TROMETHAMINE 30 MG/ML
15 SYRINGE (ML) INJECTION ONCE
Refills: 0 | Status: DISCONTINUED | OUTPATIENT
Start: 2019-01-01 | End: 2019-01-01

## 2019-01-01 RX ORDER — LISINOPRIL 2.5 MG/1
20 TABLET ORAL DAILY
Refills: 0 | Status: DISCONTINUED | OUTPATIENT
Start: 2019-01-01 | End: 2019-01-01

## 2019-01-01 RX ORDER — INSULIN LISPRO 100/ML
VIAL (ML) SUBCUTANEOUS
Refills: 0 | Status: DISCONTINUED | OUTPATIENT
Start: 2019-01-01 | End: 2019-01-01

## 2019-01-01 RX ORDER — ACETAMINOPHEN 500 MG
1000 TABLET ORAL ONCE
Refills: 0 | Status: COMPLETED | OUTPATIENT
Start: 2019-01-01 | End: 2019-01-01

## 2019-01-01 RX ORDER — INSULIN GLARGINE 100 [IU]/ML
10 INJECTION, SOLUTION SUBCUTANEOUS AT BEDTIME
Refills: 0 | Status: DISCONTINUED | OUTPATIENT
Start: 2019-01-01 | End: 2019-01-01

## 2019-01-01 RX ORDER — SODIUM CHLORIDE 9 MG/ML
500 INJECTION, SOLUTION INTRAVENOUS ONCE
Refills: 0 | Status: COMPLETED | OUTPATIENT
Start: 2019-01-01 | End: 2019-01-01

## 2019-01-01 RX ORDER — WARFARIN SODIUM 2.5 MG/1
5 TABLET ORAL DAILY
Refills: 0 | Status: COMPLETED | OUTPATIENT
Start: 2019-01-01 | End: 2019-01-01

## 2019-01-01 RX ORDER — OXYCODONE HYDROCHLORIDE 5 MG/1
5 TABLET ORAL EVERY 6 HOURS
Refills: 0 | Status: DISCONTINUED | OUTPATIENT
Start: 2019-01-01 | End: 2019-01-01

## 2019-01-01 RX ORDER — CHOLECALCIFEROL (VITAMIN D3) 125 MCG
1 CAPSULE ORAL
Qty: 0 | Refills: 0 | DISCHARGE

## 2019-01-01 RX ORDER — AMLODIPINE BESYLATE 2.5 MG/1
5 TABLET ORAL DAILY
Refills: 0 | Status: DISCONTINUED | OUTPATIENT
Start: 2019-01-01 | End: 2019-01-01

## 2019-01-01 RX ORDER — ALBUTEROL 90 UG/1
2 AEROSOL, METERED ORAL EVERY 6 HOURS
Refills: 0 | Status: DISCONTINUED | OUTPATIENT
Start: 2019-01-01 | End: 2019-01-01

## 2019-01-01 RX ORDER — INSULIN LISPRO 100/ML
VIAL (ML) SUBCUTANEOUS EVERY 6 HOURS
Refills: 0 | Status: DISCONTINUED | OUTPATIENT
Start: 2019-01-01 | End: 2019-01-01

## 2019-01-01 RX ORDER — SODIUM CHLORIDE 9 MG/ML
1000 INJECTION INTRAMUSCULAR; INTRAVENOUS; SUBCUTANEOUS
Refills: 0 | Status: DISCONTINUED | OUTPATIENT
Start: 2019-01-01 | End: 2019-01-01

## 2019-01-01 RX ORDER — METFORMIN HYDROCHLORIDE 850 MG/1
1 TABLET ORAL
Qty: 0 | Refills: 0 | DISCHARGE

## 2019-01-01 RX ORDER — ALBUTEROL 90 UG/1
2.5 AEROSOL, METERED ORAL EVERY 6 HOURS
Refills: 0 | Status: DISCONTINUED | OUTPATIENT
Start: 2019-01-01 | End: 2019-01-01

## 2019-01-01 RX ORDER — DEXTROSE 50 % IN WATER 50 %
15 SYRINGE (ML) INTRAVENOUS ONCE
Refills: 0 | Status: DISCONTINUED | OUTPATIENT
Start: 2019-01-01 | End: 2019-01-01

## 2019-01-01 RX ORDER — SODIUM CHLORIDE 9 MG/ML
1000 INJECTION, SOLUTION INTRAVENOUS
Refills: 0 | Status: DISCONTINUED | OUTPATIENT
Start: 2019-01-01 | End: 2019-01-01

## 2019-01-01 RX ORDER — AMLODIPINE BESYLATE 2.5 MG/1
5 TABLET ORAL ONCE
Refills: 0 | Status: COMPLETED | OUTPATIENT
Start: 2019-01-01 | End: 2019-01-01

## 2019-01-01 RX ORDER — POLYETHYLENE GLYCOL 3350 17 G/17G
17 POWDER, FOR SOLUTION ORAL
Qty: 0 | Refills: 0 | DISCHARGE
Start: 2019-01-01

## 2019-01-01 RX ORDER — GLIPIZIDE 5 MG/1
5 TABLET ORAL TWICE DAILY
Qty: 120 | Refills: 2 | Status: ACTIVE | COMMUNITY
Start: 2017-09-05 | End: 1900-01-01

## 2019-01-01 RX ORDER — WARFARIN SODIUM 2.5 MG/1
5 TABLET ORAL DAILY
Refills: 0 | Status: DISCONTINUED | OUTPATIENT
Start: 2019-01-01 | End: 2019-01-01

## 2019-01-01 RX ORDER — HYDRALAZINE HCL 50 MG
10 TABLET ORAL ONCE
Refills: 0 | Status: DISCONTINUED | OUTPATIENT
Start: 2019-01-01 | End: 2019-01-01

## 2019-01-01 RX ORDER — CHLORHEXIDINE GLUCONATE 213 G/1000ML
1 SOLUTION TOPICAL DAILY
Refills: 0 | Status: DISCONTINUED | OUTPATIENT
Start: 2019-01-01 | End: 2019-01-01

## 2019-01-01 RX ORDER — ONDANSETRON 8 MG/1
4 TABLET, FILM COATED ORAL EVERY 4 HOURS
Refills: 0 | Status: DISCONTINUED | OUTPATIENT
Start: 2019-01-01 | End: 2019-01-01

## 2019-01-01 RX ORDER — DILTIAZEM HCL 120 MG
1 CAPSULE, EXT RELEASE 24 HR ORAL
Qty: 0 | Refills: 0 | DISCHARGE

## 2019-01-01 RX ORDER — DILTIAZEM HCL 120 MG
240 CAPSULE, EXT RELEASE 24 HR ORAL DAILY
Refills: 0 | Status: DISCONTINUED | OUTPATIENT
Start: 2019-01-01 | End: 2019-01-01

## 2019-01-01 RX ORDER — SOLIFENACIN SUCCINATE 5 MG/1
5 TABLET, FILM COATED ORAL DAILY
Qty: 30 | Refills: 1 | Status: COMPLETED | COMMUNITY
Start: 2018-10-16 | End: 2019-01-01

## 2019-01-01 RX ORDER — ATORVASTATIN CALCIUM 80 MG/1
1 TABLET, FILM COATED ORAL
Qty: 0 | Refills: 0 | DISCHARGE
Start: 2019-01-01

## 2019-01-01 RX ORDER — TRAMADOL HYDROCHLORIDE 50 MG/1
50 TABLET, COATED ORAL
Qty: 30 | Refills: 1 | Status: ACTIVE | COMMUNITY
Start: 2017-02-23 | End: 1900-01-01

## 2019-01-01 RX ORDER — TRAMADOL HYDROCHLORIDE 50 MG/1
50 TABLET ORAL DAILY
Refills: 0 | Status: DISCONTINUED | OUTPATIENT
Start: 2019-01-01 | End: 2019-01-01

## 2019-01-01 RX ORDER — DEXTROSE 50 % IN WATER 50 %
25 SYRINGE (ML) INTRAVENOUS ONCE
Refills: 0 | Status: DISCONTINUED | OUTPATIENT
Start: 2019-01-01 | End: 2019-01-01

## 2019-01-01 RX ORDER — ASPIRIN/CALCIUM CARB/MAGNESIUM 324 MG
81 TABLET ORAL DAILY
Refills: 0 | Status: DISCONTINUED | OUTPATIENT
Start: 2019-01-01 | End: 2019-01-01

## 2019-01-01 RX ORDER — TAMSULOSIN HYDROCHLORIDE 0.4 MG/1
0.4 CAPSULE ORAL AT BEDTIME
Refills: 0 | Status: DISCONTINUED | OUTPATIENT
Start: 2019-01-01 | End: 2019-01-01

## 2019-01-01 RX ORDER — SODIUM,POTASSIUM PHOSPHATES 278-250MG
1 POWDER IN PACKET (EA) ORAL ONCE
Refills: 0 | Status: COMPLETED | OUTPATIENT
Start: 2019-01-01 | End: 2019-01-01

## 2019-01-01 RX ORDER — WARFARIN 5 MG/1
5 TABLET ORAL
Qty: 90 | Refills: 1 | Status: ACTIVE | COMMUNITY
Start: 2017-03-10 | End: 1900-01-01

## 2019-01-01 RX ORDER — ALBUTEROL 90 UG/1
2 AEROSOL, METERED ORAL
Qty: 0 | Refills: 0 | DISCHARGE

## 2019-01-01 RX ORDER — GLUCAGON INJECTION, SOLUTION 0.5 MG/.1ML
1 INJECTION, SOLUTION SUBCUTANEOUS ONCE
Refills: 0 | Status: DISCONTINUED | OUTPATIENT
Start: 2019-01-01 | End: 2019-01-01

## 2019-01-01 RX ORDER — SODIUM CHLORIDE 9 MG/ML
500 INJECTION INTRAMUSCULAR; INTRAVENOUS; SUBCUTANEOUS ONCE
Refills: 0 | Status: COMPLETED | OUTPATIENT
Start: 2019-01-01 | End: 2019-01-01

## 2019-01-01 RX ORDER — MAGNESIUM SULFATE 500 MG/ML
2 VIAL (ML) INJECTION ONCE
Refills: 0 | Status: COMPLETED | OUTPATIENT
Start: 2019-01-01 | End: 2019-01-01

## 2019-01-01 RX ORDER — METFORMIN HYDROCHLORIDE 850 MG/1
1000 TABLET ORAL ONCE
Refills: 0 | Status: DISCONTINUED | OUTPATIENT
Start: 2019-01-01 | End: 2019-01-01

## 2019-01-01 RX ORDER — FENTANYL CITRATE 50 UG/ML
25 INJECTION INTRAVENOUS ONCE
Refills: 0 | Status: DISCONTINUED | OUTPATIENT
Start: 2019-01-01 | End: 2019-01-01

## 2019-01-01 RX ORDER — TRAMADOL HYDROCHLORIDE 50 MG/1
1 TABLET ORAL
Qty: 0 | Refills: 0 | DISCHARGE

## 2019-01-01 RX ORDER — DEXTROSE 50 % IN WATER 50 %
12.5 SYRINGE (ML) INTRAVENOUS ONCE
Refills: 0 | Status: DISCONTINUED | OUTPATIENT
Start: 2019-01-01 | End: 2019-01-01

## 2019-01-01 RX ORDER — ENOXAPARIN SODIUM 100 MG/ML
75 INJECTION SUBCUTANEOUS EVERY 12 HOURS
Refills: 0 | Status: DISCONTINUED | OUTPATIENT
Start: 2019-01-01 | End: 2019-01-01

## 2019-01-01 RX ORDER — SENNA PLUS 8.6 MG/1
2 TABLET ORAL AT BEDTIME
Refills: 0 | Status: DISCONTINUED | OUTPATIENT
Start: 2019-01-01 | End: 2019-01-01

## 2019-01-01 RX ORDER — FINASTERIDE 5 MG/1
1 TABLET, FILM COATED ORAL
Qty: 0 | Refills: 0 | DISCHARGE
Start: 2019-01-01

## 2019-01-01 RX ORDER — HYDRALAZINE HCL 50 MG
10 TABLET ORAL ONCE
Refills: 0 | Status: COMPLETED | OUTPATIENT
Start: 2019-01-01 | End: 2019-01-01

## 2019-01-01 RX ORDER — AMLODIPINE BESYLATE 2.5 MG/1
1 TABLET ORAL
Qty: 0 | Refills: 0 | DISCHARGE

## 2019-01-01 RX ORDER — HEPARIN SODIUM 5000 [USP'U]/ML
5000 INJECTION INTRAVENOUS; SUBCUTANEOUS EVERY 8 HOURS
Refills: 0 | Status: DISCONTINUED | OUTPATIENT
Start: 2019-01-01 | End: 2019-01-01

## 2019-01-01 RX ORDER — DILTIAZEM HYDROCHLORIDE 240 MG/1
240 CAPSULE, EXTENDED RELEASE ORAL
Qty: 90 | Refills: 1 | Status: ACTIVE | COMMUNITY
Start: 2018-07-20 | End: 1900-01-01

## 2019-01-01 RX ORDER — LANOLIN ALCOHOL/MO/W.PET/CERES
1 CREAM (GRAM) TOPICAL
Qty: 0 | Refills: 0 | DISCHARGE
Start: 2019-01-01

## 2019-01-01 RX ORDER — NICOTINE POLACRILEX 2 MG
1 GUM BUCCAL DAILY
Refills: 0 | Status: DISCONTINUED | OUTPATIENT
Start: 2019-01-01 | End: 2019-01-01

## 2019-01-01 RX ORDER — INSULIN HUMAN 100 [IU]/ML
INJECTION, SOLUTION SUBCUTANEOUS EVERY 6 HOURS
Refills: 0 | Status: DISCONTINUED | OUTPATIENT
Start: 2019-01-01 | End: 2019-01-01

## 2019-01-01 RX ORDER — POLYETHYLENE GLYCOL 3350 17 G/17G
17 POWDER, FOR SOLUTION ORAL DAILY
Refills: 0 | Status: DISCONTINUED | OUTPATIENT
Start: 2019-01-01 | End: 2019-01-01

## 2019-01-01 RX ORDER — INSULIN LISPRO 100/ML
0 VIAL (ML) SUBCUTANEOUS
Qty: 0 | Refills: 0 | DISCHARGE

## 2019-01-01 RX ORDER — SODIUM,POTASSIUM PHOSPHATES 278-250MG
1 POWDER IN PACKET (EA) ORAL
Refills: 0 | Status: COMPLETED | OUTPATIENT
Start: 2019-01-01 | End: 2019-01-01

## 2019-01-01 RX ORDER — LANOLIN ALCOHOL/MO/W.PET/CERES
5 CREAM (GRAM) TOPICAL AT BEDTIME
Refills: 0 | Status: DISCONTINUED | OUTPATIENT
Start: 2019-01-01 | End: 2019-01-01

## 2019-01-01 RX ORDER — INSULIN LISPRO 100/ML
VIAL (ML) SUBCUTANEOUS AT BEDTIME
Refills: 0 | Status: DISCONTINUED | OUTPATIENT
Start: 2019-01-01 | End: 2019-01-01

## 2019-01-01 RX ORDER — FINASTERIDE 5 MG/1
1 TABLET, FILM COATED ORAL
Qty: 0 | Refills: 0 | DISCHARGE

## 2019-01-01 RX ORDER — TAMSULOSIN HYDROCHLORIDE 0.4 MG/1
1 CAPSULE ORAL
Qty: 0 | Refills: 0 | DISCHARGE

## 2019-01-01 RX ORDER — ALBUTEROL 90 UG/1
3 AEROSOL, METERED ORAL
Qty: 0 | Refills: 0 | DISCHARGE

## 2019-01-01 RX ORDER — FLUTICASONE PROPIONATE AND SALMETEROL 50; 250 UG/1; UG/1
1 POWDER ORAL; RESPIRATORY (INHALATION)
Qty: 0 | Refills: 0 | DISCHARGE

## 2019-01-01 RX ORDER — WARFARIN SODIUM 2.5 MG/1
10 TABLET ORAL DAILY
Refills: 0 | Status: DISCONTINUED | OUTPATIENT
Start: 2019-01-01 | End: 2019-01-01

## 2019-01-01 RX ORDER — METFORMIN HYDROCHLORIDE 850 MG/1
1000 TABLET ORAL
Refills: 0 | Status: DISCONTINUED | OUTPATIENT
Start: 2019-01-01 | End: 2019-01-01

## 2019-01-01 RX ORDER — RAMIPRIL 5 MG
1 CAPSULE ORAL
Qty: 0 | Refills: 0 | DISCHARGE

## 2019-01-01 RX ORDER — TUBERCULIN PURIFIED PROTEIN DERIVATIVE 5 [IU]/.1ML
5 INJECTION, SOLUTION INTRADERMAL ONCE
Refills: 0 | Status: COMPLETED | OUTPATIENT
Start: 2019-01-01 | End: 2019-01-01

## 2019-01-01 RX ORDER — WARFARIN SODIUM 2.5 MG/1
1 TABLET ORAL
Qty: 0 | Refills: 0 | DISCHARGE

## 2019-01-01 RX ORDER — WARFARIN SODIUM 2.5 MG/1
5 TABLET ORAL AT BEDTIME
Refills: 0 | Status: DISCONTINUED | OUTPATIENT
Start: 2019-01-01 | End: 2019-01-01

## 2019-01-01 RX ORDER — FINASTERIDE 5 MG/1
5 TABLET, FILM COATED ORAL DAILY
Refills: 0 | Status: DISCONTINUED | OUTPATIENT
Start: 2019-01-01 | End: 2019-01-01

## 2019-01-01 RX ORDER — BUDESONIDE AND FORMOTEROL FUMARATE DIHYDRATE 160; 4.5 UG/1; UG/1
2 AEROSOL RESPIRATORY (INHALATION)
Refills: 0 | Status: DISCONTINUED | OUTPATIENT
Start: 2019-01-01 | End: 2019-01-01

## 2019-01-01 RX ORDER — ACETAMINOPHEN 500 MG
650 TABLET ORAL EVERY 6 HOURS
Refills: 0 | Status: DISCONTINUED | OUTPATIENT
Start: 2019-01-01 | End: 2019-01-01

## 2019-01-01 RX ORDER — ACETAMINOPHEN 500 MG
975 TABLET ORAL EVERY 6 HOURS
Refills: 0 | Status: DISCONTINUED | OUTPATIENT
Start: 2019-01-01 | End: 2019-01-01

## 2019-01-01 RX ORDER — INSULIN LISPRO 100/ML
10 VIAL (ML) SUBCUTANEOUS
Refills: 0 | Status: DISCONTINUED | OUTPATIENT
Start: 2019-01-01 | End: 2019-01-01

## 2019-01-01 RX ORDER — WARFARIN SODIUM 2.5 MG/1
10 TABLET ORAL ONCE
Refills: 0 | Status: DISCONTINUED | OUTPATIENT
Start: 2019-01-01 | End: 2019-01-01

## 2019-01-01 RX ORDER — ACETAMINOPHEN 500 MG
3 TABLET ORAL
Qty: 0 | Refills: 0 | DISCHARGE
Start: 2019-01-01

## 2019-01-01 RX ORDER — HALOPERIDOL DECANOATE 100 MG/ML
2.5 INJECTION INTRAMUSCULAR ONCE
Refills: 0 | Status: COMPLETED | OUTPATIENT
Start: 2019-01-01 | End: 2019-01-01

## 2019-01-01 RX ORDER — SENNA PLUS 8.6 MG/1
2 TABLET ORAL
Qty: 0 | Refills: 0 | DISCHARGE
Start: 2019-01-01

## 2019-01-01 RX ORDER — WARFARIN SODIUM 2.5 MG/1
7.5 TABLET ORAL AT BEDTIME
Refills: 0 | Status: DISCONTINUED | OUTPATIENT
Start: 2019-01-01 | End: 2019-01-01

## 2019-01-01 RX ADMIN — ATORVASTATIN CALCIUM 20 MILLIGRAM(S): 80 TABLET, FILM COATED ORAL at 22:36

## 2019-01-01 RX ADMIN — Medication 10 MILLIGRAM(S): at 02:34

## 2019-01-01 RX ADMIN — Medication 2: at 17:17

## 2019-01-01 RX ADMIN — Medication 2: at 11:31

## 2019-01-01 RX ADMIN — TRAMADOL HYDROCHLORIDE 50 MILLIGRAM(S): 50 TABLET ORAL at 11:00

## 2019-01-01 RX ADMIN — Medication 2: at 08:51

## 2019-01-01 RX ADMIN — INSULIN GLARGINE 10 UNIT(S): 100 INJECTION, SOLUTION SUBCUTANEOUS at 22:39

## 2019-01-01 RX ADMIN — Medication 10 UNIT(S): at 11:50

## 2019-01-01 RX ADMIN — Medication 5 MILLIGRAM(S): at 22:36

## 2019-01-01 RX ADMIN — POLYETHYLENE GLYCOL 3350 17 GRAM(S): 17 POWDER, FOR SOLUTION ORAL at 12:09

## 2019-01-01 RX ADMIN — WARFARIN SODIUM 5 MILLIGRAM(S): 2.5 TABLET ORAL at 21:26

## 2019-01-01 RX ADMIN — METFORMIN HYDROCHLORIDE 1000 MILLIGRAM(S): 850 TABLET ORAL at 05:28

## 2019-01-01 RX ADMIN — SODIUM CHLORIDE 1000 MILLILITER(S): 9 INJECTION, SOLUTION INTRAVENOUS at 11:32

## 2019-01-01 RX ADMIN — Medication 1 PATCH: at 08:06

## 2019-01-01 RX ADMIN — Medication 81 MILLIGRAM(S): at 13:20

## 2019-01-01 RX ADMIN — METFORMIN HYDROCHLORIDE 1000 MILLIGRAM(S): 850 TABLET ORAL at 17:41

## 2019-01-01 RX ADMIN — METFORMIN HYDROCHLORIDE 1000 MILLIGRAM(S): 850 TABLET ORAL at 17:21

## 2019-01-01 RX ADMIN — OXYCODONE HYDROCHLORIDE 5 MILLIGRAM(S): 5 TABLET ORAL at 11:30

## 2019-01-01 RX ADMIN — TAMSULOSIN HYDROCHLORIDE 0.4 MILLIGRAM(S): 0.4 CAPSULE ORAL at 22:06

## 2019-01-01 RX ADMIN — Medication 240 MILLIGRAM(S): at 05:27

## 2019-01-01 RX ADMIN — Medication 1 PACKET(S): at 17:02

## 2019-01-01 RX ADMIN — BUDESONIDE AND FORMOTEROL FUMARATE DIHYDRATE 2 PUFF(S): 160; 4.5 AEROSOL RESPIRATORY (INHALATION) at 21:01

## 2019-01-01 RX ADMIN — Medication 2: at 17:28

## 2019-01-01 RX ADMIN — Medication 400 MILLIGRAM(S): at 14:30

## 2019-01-01 RX ADMIN — FINASTERIDE 5 MILLIGRAM(S): 5 TABLET, FILM COATED ORAL at 14:11

## 2019-01-01 RX ADMIN — Medication 975 MILLIGRAM(S): at 17:42

## 2019-01-01 RX ADMIN — CHLORHEXIDINE GLUCONATE 1 APPLICATION(S): 213 SOLUTION TOPICAL at 11:33

## 2019-01-01 RX ADMIN — HALOPERIDOL DECANOATE 2.5 MILLIGRAM(S): 100 INJECTION INTRAMUSCULAR at 17:00

## 2019-01-01 RX ADMIN — Medication 10 UNIT(S): at 08:17

## 2019-01-01 RX ADMIN — Medication 240 MILLIGRAM(S): at 06:34

## 2019-01-01 RX ADMIN — FINASTERIDE 5 MILLIGRAM(S): 5 TABLET, FILM COATED ORAL at 12:09

## 2019-01-01 RX ADMIN — TAMSULOSIN HYDROCHLORIDE 0.4 MILLIGRAM(S): 0.4 CAPSULE ORAL at 21:34

## 2019-01-01 RX ADMIN — TAMSULOSIN HYDROCHLORIDE 0.4 MILLIGRAM(S): 0.4 CAPSULE ORAL at 21:16

## 2019-01-01 RX ADMIN — Medication 1000 MILLIGRAM(S): at 14:00

## 2019-01-01 RX ADMIN — Medication 1 PATCH: at 16:58

## 2019-01-01 RX ADMIN — FINASTERIDE 5 MILLIGRAM(S): 5 TABLET, FILM COATED ORAL at 11:33

## 2019-01-01 RX ADMIN — Medication 975 MILLIGRAM(S): at 06:22

## 2019-01-01 RX ADMIN — AMLODIPINE BESYLATE 5 MILLIGRAM(S): 2.5 TABLET ORAL at 05:10

## 2019-01-01 RX ADMIN — Medication 240 MILLIGRAM(S): at 06:45

## 2019-01-01 RX ADMIN — Medication 2: at 23:55

## 2019-01-01 RX ADMIN — BUDESONIDE AND FORMOTEROL FUMARATE DIHYDRATE 2 PUFF(S): 160; 4.5 AEROSOL RESPIRATORY (INHALATION) at 09:08

## 2019-01-01 RX ADMIN — Medication 1 TABLET(S): at 21:44

## 2019-01-01 RX ADMIN — Medication 15 MILLIGRAM(S): at 19:45

## 2019-01-01 RX ADMIN — LISINOPRIL 20 MILLIGRAM(S): 2.5 TABLET ORAL at 05:22

## 2019-01-01 RX ADMIN — Medication 240 MILLIGRAM(S): at 06:36

## 2019-01-01 RX ADMIN — Medication 975 MILLIGRAM(S): at 05:22

## 2019-01-01 RX ADMIN — Medication 240 MILLIGRAM(S): at 05:33

## 2019-01-01 RX ADMIN — ATORVASTATIN CALCIUM 20 MILLIGRAM(S): 80 TABLET, FILM COATED ORAL at 22:06

## 2019-01-01 RX ADMIN — Medication 240 MILLIGRAM(S): at 05:11

## 2019-01-01 RX ADMIN — Medication 10 UNIT(S): at 08:05

## 2019-01-01 RX ADMIN — ATORVASTATIN CALCIUM 20 MILLIGRAM(S): 80 TABLET, FILM COATED ORAL at 21:34

## 2019-01-01 RX ADMIN — TRAMADOL HYDROCHLORIDE 50 MILLIGRAM(S): 50 TABLET ORAL at 10:48

## 2019-01-01 RX ADMIN — Medication 1 PATCH: at 10:48

## 2019-01-01 RX ADMIN — ATORVASTATIN CALCIUM 20 MILLIGRAM(S): 80 TABLET, FILM COATED ORAL at 21:13

## 2019-01-01 RX ADMIN — Medication 2: at 08:24

## 2019-01-01 RX ADMIN — LISINOPRIL 20 MILLIGRAM(S): 2.5 TABLET ORAL at 06:45

## 2019-01-01 RX ADMIN — Medication 975 MILLIGRAM(S): at 12:01

## 2019-01-01 RX ADMIN — TRAMADOL HYDROCHLORIDE 50 MILLIGRAM(S): 50 TABLET ORAL at 14:00

## 2019-01-01 RX ADMIN — Medication 1: at 08:06

## 2019-01-01 RX ADMIN — FINASTERIDE 5 MILLIGRAM(S): 5 TABLET, FILM COATED ORAL at 18:04

## 2019-01-01 RX ADMIN — WARFARIN SODIUM 5 MILLIGRAM(S): 2.5 TABLET ORAL at 21:34

## 2019-01-01 RX ADMIN — Medication 1 PATCH: at 08:45

## 2019-01-01 RX ADMIN — METFORMIN HYDROCHLORIDE 1000 MILLIGRAM(S): 850 TABLET ORAL at 17:18

## 2019-01-01 RX ADMIN — Medication 4: at 08:19

## 2019-01-01 RX ADMIN — Medication 240 MILLIGRAM(S): at 05:47

## 2019-01-01 RX ADMIN — Medication 81 MILLIGRAM(S): at 14:11

## 2019-01-01 RX ADMIN — Medication 1 PATCH: at 19:36

## 2019-01-01 RX ADMIN — Medication 5: at 11:50

## 2019-01-01 RX ADMIN — SENNA PLUS 2 TABLET(S): 8.6 TABLET ORAL at 21:34

## 2019-01-01 RX ADMIN — Medication 975 MILLIGRAM(S): at 06:30

## 2019-01-01 RX ADMIN — INSULIN GLARGINE 10 UNIT(S): 100 INJECTION, SOLUTION SUBCUTANEOUS at 23:11

## 2019-01-01 RX ADMIN — Medication 400 MILLIGRAM(S): at 13:48

## 2019-01-01 RX ADMIN — AMLODIPINE BESYLATE 5 MILLIGRAM(S): 2.5 TABLET ORAL at 05:18

## 2019-01-01 RX ADMIN — TAMSULOSIN HYDROCHLORIDE 0.4 MILLIGRAM(S): 0.4 CAPSULE ORAL at 22:36

## 2019-01-01 RX ADMIN — Medication 1000 MILLIGRAM(S): at 15:00

## 2019-01-01 RX ADMIN — Medication 975 MILLIGRAM(S): at 01:28

## 2019-01-01 RX ADMIN — SENNA PLUS 2 TABLET(S): 8.6 TABLET ORAL at 22:41

## 2019-01-01 RX ADMIN — FINASTERIDE 5 MILLIGRAM(S): 5 TABLET, FILM COATED ORAL at 12:33

## 2019-01-01 RX ADMIN — Medication 975 MILLIGRAM(S): at 12:37

## 2019-01-01 RX ADMIN — METFORMIN HYDROCHLORIDE 1000 MILLIGRAM(S): 850 TABLET ORAL at 06:45

## 2019-01-01 RX ADMIN — POLYETHYLENE GLYCOL 3350 17 GRAM(S): 17 POWDER, FOR SOLUTION ORAL at 12:57

## 2019-01-01 RX ADMIN — ATORVASTATIN CALCIUM 20 MILLIGRAM(S): 80 TABLET, FILM COATED ORAL at 21:18

## 2019-01-01 RX ADMIN — METFORMIN HYDROCHLORIDE 1000 MILLIGRAM(S): 850 TABLET ORAL at 17:28

## 2019-01-01 RX ADMIN — Medication 1 PATCH: at 11:50

## 2019-01-01 RX ADMIN — ATORVASTATIN CALCIUM 20 MILLIGRAM(S): 80 TABLET, FILM COATED ORAL at 21:43

## 2019-01-01 RX ADMIN — Medication 2: at 12:10

## 2019-01-01 RX ADMIN — AMLODIPINE BESYLATE 5 MILLIGRAM(S): 2.5 TABLET ORAL at 05:24

## 2019-01-01 RX ADMIN — SENNA PLUS 2 TABLET(S): 8.6 TABLET ORAL at 21:44

## 2019-01-01 RX ADMIN — Medication 3: at 13:13

## 2019-01-01 RX ADMIN — LISINOPRIL 20 MILLIGRAM(S): 2.5 TABLET ORAL at 17:02

## 2019-01-01 RX ADMIN — Medication 10 UNIT(S): at 12:04

## 2019-01-01 RX ADMIN — Medication 2: at 12:30

## 2019-01-01 RX ADMIN — WARFARIN SODIUM 5 MILLIGRAM(S): 2.5 TABLET ORAL at 21:13

## 2019-01-01 RX ADMIN — Medication 2: at 08:18

## 2019-01-01 RX ADMIN — FINASTERIDE 5 MILLIGRAM(S): 5 TABLET, FILM COATED ORAL at 12:31

## 2019-01-01 RX ADMIN — Medication 1: at 17:21

## 2019-01-01 RX ADMIN — AMLODIPINE BESYLATE 5 MILLIGRAM(S): 2.5 TABLET ORAL at 05:27

## 2019-01-01 RX ADMIN — Medication 1 PATCH: at 14:13

## 2019-01-01 RX ADMIN — TRAMADOL HYDROCHLORIDE 50 MILLIGRAM(S): 50 TABLET ORAL at 13:11

## 2019-01-01 RX ADMIN — Medication 1 TABLET(S): at 12:09

## 2019-01-01 RX ADMIN — Medication 15 MILLIGRAM(S): at 21:18

## 2019-01-01 RX ADMIN — Medication 240 MILLIGRAM(S): at 05:18

## 2019-01-01 RX ADMIN — TUBERCULIN PURIFIED PROTEIN DERIVATIVE 5 UNIT(S): 5 INJECTION, SOLUTION INTRADERMAL at 14:11

## 2019-01-01 RX ADMIN — CHLORHEXIDINE GLUCONATE 1 APPLICATION(S): 213 SOLUTION TOPICAL at 11:55

## 2019-01-01 RX ADMIN — LISINOPRIL 20 MILLIGRAM(S): 2.5 TABLET ORAL at 05:47

## 2019-01-01 RX ADMIN — TRAMADOL HYDROCHLORIDE 50 MILLIGRAM(S): 50 TABLET ORAL at 12:22

## 2019-01-01 RX ADMIN — ENOXAPARIN SODIUM 75 MILLIGRAM(S): 100 INJECTION SUBCUTANEOUS at 18:42

## 2019-01-01 RX ADMIN — Medication 1 PATCH: at 14:11

## 2019-01-01 RX ADMIN — Medication 1: at 08:55

## 2019-01-01 RX ADMIN — AMLODIPINE BESYLATE 5 MILLIGRAM(S): 2.5 TABLET ORAL at 06:45

## 2019-01-01 RX ADMIN — Medication 975 MILLIGRAM(S): at 17:17

## 2019-01-01 RX ADMIN — AMLODIPINE BESYLATE 5 MILLIGRAM(S): 2.5 TABLET ORAL at 05:11

## 2019-01-01 RX ADMIN — Medication 2: at 17:21

## 2019-01-01 RX ADMIN — ENOXAPARIN SODIUM 75 MILLIGRAM(S): 100 INJECTION SUBCUTANEOUS at 17:20

## 2019-01-01 RX ADMIN — Medication 1: at 13:10

## 2019-01-01 RX ADMIN — Medication 4: at 12:57

## 2019-01-01 RX ADMIN — Medication 1: at 12:04

## 2019-01-01 RX ADMIN — Medication 81 MILLIGRAM(S): at 11:50

## 2019-01-01 RX ADMIN — Medication 975 MILLIGRAM(S): at 12:09

## 2019-01-01 RX ADMIN — BUDESONIDE AND FORMOTEROL FUMARATE DIHYDRATE 2 PUFF(S): 160; 4.5 AEROSOL RESPIRATORY (INHALATION) at 08:42

## 2019-01-01 RX ADMIN — CHLORHEXIDINE GLUCONATE 1 APPLICATION(S): 213 SOLUTION TOPICAL at 17:28

## 2019-01-01 RX ADMIN — Medication 5 MILLIGRAM(S): at 23:06

## 2019-01-01 RX ADMIN — FENTANYL CITRATE 25 MICROGRAM(S): 50 INJECTION INTRAVENOUS at 13:08

## 2019-01-01 RX ADMIN — SODIUM CHLORIDE 100 MILLILITER(S): 9 INJECTION, SOLUTION INTRAVENOUS at 21:43

## 2019-01-01 RX ADMIN — AMLODIPINE BESYLATE 5 MILLIGRAM(S): 2.5 TABLET ORAL at 18:04

## 2019-01-01 RX ADMIN — Medication 650 MILLIGRAM(S): at 05:24

## 2019-01-01 RX ADMIN — Medication 240 MILLIGRAM(S): at 05:21

## 2019-01-01 RX ADMIN — FINASTERIDE 5 MILLIGRAM(S): 5 TABLET, FILM COATED ORAL at 12:57

## 2019-01-01 RX ADMIN — Medication 975 MILLIGRAM(S): at 17:09

## 2019-01-01 RX ADMIN — ATORVASTATIN CALCIUM 20 MILLIGRAM(S): 80 TABLET, FILM COATED ORAL at 23:06

## 2019-01-01 RX ADMIN — FINASTERIDE 5 MILLIGRAM(S): 5 TABLET, FILM COATED ORAL at 11:50

## 2019-01-01 RX ADMIN — Medication 1 PATCH: at 12:37

## 2019-01-01 RX ADMIN — ATORVASTATIN CALCIUM 20 MILLIGRAM(S): 80 TABLET, FILM COATED ORAL at 21:26

## 2019-01-01 RX ADMIN — Medication 975 MILLIGRAM(S): at 13:20

## 2019-01-01 RX ADMIN — Medication 81 MILLIGRAM(S): at 10:48

## 2019-01-01 RX ADMIN — Medication 2: at 08:16

## 2019-01-01 RX ADMIN — TAMSULOSIN HYDROCHLORIDE 0.4 MILLIGRAM(S): 0.4 CAPSULE ORAL at 21:26

## 2019-01-01 RX ADMIN — Medication 1 PATCH: at 13:20

## 2019-01-01 RX ADMIN — WARFARIN SODIUM 5 MILLIGRAM(S): 2.5 TABLET ORAL at 21:18

## 2019-01-01 RX ADMIN — Medication 975 MILLIGRAM(S): at 00:28

## 2019-01-01 RX ADMIN — Medication 650 MILLIGRAM(S): at 20:41

## 2019-01-01 RX ADMIN — INSULIN GLARGINE 10 UNIT(S): 100 INJECTION, SOLUTION SUBCUTANEOUS at 22:06

## 2019-01-01 RX ADMIN — SODIUM CHLORIDE 100 MILLILITER(S): 9 INJECTION, SOLUTION INTRAVENOUS at 19:14

## 2019-01-01 RX ADMIN — OXYCODONE HYDROCHLORIDE 5 MILLIGRAM(S): 5 TABLET ORAL at 10:47

## 2019-01-01 RX ADMIN — Medication 1: at 08:17

## 2019-01-01 RX ADMIN — Medication 10 UNIT(S): at 13:13

## 2019-01-01 RX ADMIN — Medication 0: at 21:44

## 2019-01-01 RX ADMIN — TAMSULOSIN HYDROCHLORIDE 0.4 MILLIGRAM(S): 0.4 CAPSULE ORAL at 23:06

## 2019-01-01 RX ADMIN — Medication 1 PATCH: at 18:23

## 2019-01-01 RX ADMIN — ATORVASTATIN CALCIUM 20 MILLIGRAM(S): 80 TABLET, FILM COATED ORAL at 22:40

## 2019-01-01 RX ADMIN — BUDESONIDE AND FORMOTEROL FUMARATE DIHYDRATE 2 PUFF(S): 160; 4.5 AEROSOL RESPIRATORY (INHALATION) at 20:40

## 2019-01-01 RX ADMIN — FINASTERIDE 5 MILLIGRAM(S): 5 TABLET, FILM COATED ORAL at 13:12

## 2019-01-01 RX ADMIN — SODIUM CHLORIDE 100 MILLILITER(S): 9 INJECTION, SOLUTION INTRAVENOUS at 13:46

## 2019-01-01 RX ADMIN — SENNA PLUS 2 TABLET(S): 8.6 TABLET ORAL at 21:13

## 2019-01-01 RX ADMIN — Medication 4: at 12:33

## 2019-01-01 RX ADMIN — Medication 650 MILLIGRAM(S): at 06:02

## 2019-01-01 RX ADMIN — METFORMIN HYDROCHLORIDE 1000 MILLIGRAM(S): 850 TABLET ORAL at 05:27

## 2019-01-01 RX ADMIN — TRAMADOL HYDROCHLORIDE 50 MILLIGRAM(S): 50 TABLET ORAL at 14:12

## 2019-01-01 RX ADMIN — FINASTERIDE 5 MILLIGRAM(S): 5 TABLET, FILM COATED ORAL at 11:54

## 2019-01-01 RX ADMIN — Medication 1: at 12:57

## 2019-01-01 RX ADMIN — TAMSULOSIN HYDROCHLORIDE 0.4 MILLIGRAM(S): 0.4 CAPSULE ORAL at 21:44

## 2019-01-01 RX ADMIN — Medication 650 MILLIGRAM(S): at 20:04

## 2019-01-01 RX ADMIN — Medication 975 MILLIGRAM(S): at 11:53

## 2019-01-01 RX ADMIN — Medication 3: at 17:30

## 2019-01-01 RX ADMIN — Medication 15 MILLIGRAM(S): at 20:30

## 2019-01-01 RX ADMIN — Medication 975 MILLIGRAM(S): at 11:31

## 2019-01-01 RX ADMIN — ENOXAPARIN SODIUM 75 MILLIGRAM(S): 100 INJECTION SUBCUTANEOUS at 06:46

## 2019-01-01 RX ADMIN — Medication 975 MILLIGRAM(S): at 02:30

## 2019-01-01 RX ADMIN — AMLODIPINE BESYLATE 5 MILLIGRAM(S): 2.5 TABLET ORAL at 06:35

## 2019-01-01 RX ADMIN — Medication 975 MILLIGRAM(S): at 12:30

## 2019-01-01 RX ADMIN — METFORMIN HYDROCHLORIDE 1000 MILLIGRAM(S): 850 TABLET ORAL at 17:03

## 2019-01-01 RX ADMIN — INSULIN HUMAN 2: 100 INJECTION, SOLUTION SUBCUTANEOUS at 05:49

## 2019-01-01 RX ADMIN — SENNA PLUS 2 TABLET(S): 8.6 TABLET ORAL at 21:18

## 2019-01-01 RX ADMIN — METFORMIN HYDROCHLORIDE 1000 MILLIGRAM(S): 850 TABLET ORAL at 05:22

## 2019-01-01 RX ADMIN — Medication 4: at 11:58

## 2019-01-01 RX ADMIN — BUDESONIDE AND FORMOTEROL FUMARATE DIHYDRATE 2 PUFF(S): 160; 4.5 AEROSOL RESPIRATORY (INHALATION) at 09:39

## 2019-01-01 RX ADMIN — FINASTERIDE 5 MILLIGRAM(S): 5 TABLET, FILM COATED ORAL at 13:20

## 2019-01-01 RX ADMIN — METFORMIN HYDROCHLORIDE 1000 MILLIGRAM(S): 850 TABLET ORAL at 05:24

## 2019-01-01 RX ADMIN — Medication 1 PATCH: at 19:50

## 2019-01-01 RX ADMIN — LISINOPRIL 20 MILLIGRAM(S): 2.5 TABLET ORAL at 05:27

## 2019-01-01 RX ADMIN — Medication 1 PATCH: at 13:11

## 2019-01-01 RX ADMIN — ENOXAPARIN SODIUM 75 MILLIGRAM(S): 100 INJECTION SUBCUTANEOUS at 14:49

## 2019-01-01 RX ADMIN — Medication 1 PATCH: at 18:48

## 2019-01-01 RX ADMIN — Medication 10 UNIT(S): at 17:22

## 2019-01-01 RX ADMIN — Medication 240 MILLIGRAM(S): at 05:24

## 2019-01-01 RX ADMIN — TRAMADOL HYDROCHLORIDE 50 MILLIGRAM(S): 50 TABLET ORAL at 13:39

## 2019-01-01 RX ADMIN — FINASTERIDE 5 MILLIGRAM(S): 5 TABLET, FILM COATED ORAL at 10:48

## 2019-01-01 RX ADMIN — WARFARIN SODIUM 10 MILLIGRAM(S): 2.5 TABLET ORAL at 22:36

## 2019-01-01 RX ADMIN — TAMSULOSIN HYDROCHLORIDE 0.4 MILLIGRAM(S): 0.4 CAPSULE ORAL at 22:23

## 2019-01-01 RX ADMIN — WARFARIN SODIUM 5 MILLIGRAM(S): 2.5 TABLET ORAL at 22:07

## 2019-01-01 RX ADMIN — METFORMIN HYDROCHLORIDE 1000 MILLIGRAM(S): 850 TABLET ORAL at 18:04

## 2019-01-01 RX ADMIN — Medication 10 UNIT(S): at 16:59

## 2019-01-01 RX ADMIN — Medication 975 MILLIGRAM(S): at 19:00

## 2019-01-01 RX ADMIN — Medication 50 GRAM(S): at 22:41

## 2019-01-01 RX ADMIN — Medication 975 MILLIGRAM(S): at 05:27

## 2019-01-01 RX ADMIN — POLYETHYLENE GLYCOL 3350 17 GRAM(S): 17 POWDER, FOR SOLUTION ORAL at 12:33

## 2019-01-01 RX ADMIN — Medication 15 MILLIGRAM(S): at 20:48

## 2019-01-01 RX ADMIN — Medication 975 MILLIGRAM(S): at 17:03

## 2019-01-01 RX ADMIN — LISINOPRIL 20 MILLIGRAM(S): 2.5 TABLET ORAL at 05:24

## 2019-01-01 RX ADMIN — POLYETHYLENE GLYCOL 3350 17 GRAM(S): 17 POWDER, FOR SOLUTION ORAL at 12:31

## 2019-01-01 RX ADMIN — ALBUTEROL 2.5 MILLIGRAM(S): 90 AEROSOL, METERED ORAL at 18:30

## 2019-01-01 RX ADMIN — TRAMADOL HYDROCHLORIDE 50 MILLIGRAM(S): 50 TABLET ORAL at 14:42

## 2019-01-01 RX ADMIN — AMLODIPINE BESYLATE 5 MILLIGRAM(S): 2.5 TABLET ORAL at 05:22

## 2019-01-01 RX ADMIN — Medication 4: at 11:56

## 2019-01-01 RX ADMIN — TRAMADOL HYDROCHLORIDE 50 MILLIGRAM(S): 50 TABLET ORAL at 11:52

## 2019-01-01 RX ADMIN — Medication 240 MILLIGRAM(S): at 05:10

## 2019-01-01 RX ADMIN — POLYETHYLENE GLYCOL 3350 17 GRAM(S): 17 POWDER, FOR SOLUTION ORAL at 13:45

## 2019-01-01 RX ADMIN — Medication 1 TABLET(S): at 08:50

## 2019-01-01 RX ADMIN — ENOXAPARIN SODIUM 75 MILLIGRAM(S): 100 INJECTION SUBCUTANEOUS at 05:27

## 2019-01-01 RX ADMIN — TRAMADOL HYDROCHLORIDE 50 MILLIGRAM(S): 50 TABLET ORAL at 14:10

## 2019-01-01 RX ADMIN — Medication 2: at 08:54

## 2019-01-01 RX ADMIN — Medication 975 MILLIGRAM(S): at 07:08

## 2019-01-01 RX ADMIN — TAMSULOSIN HYDROCHLORIDE 0.4 MILLIGRAM(S): 0.4 CAPSULE ORAL at 21:18

## 2019-01-01 RX ADMIN — Medication 975 MILLIGRAM(S): at 00:58

## 2019-01-01 RX ADMIN — Medication 1 TABLET(S): at 17:41

## 2019-01-01 RX ADMIN — SODIUM CHLORIDE 1000 MILLILITER(S): 9 INJECTION, SOLUTION INTRAVENOUS at 18:46

## 2019-01-01 RX ADMIN — METFORMIN HYDROCHLORIDE 1000 MILLIGRAM(S): 850 TABLET ORAL at 06:34

## 2019-01-01 RX ADMIN — WARFARIN SODIUM 7.5 MILLIGRAM(S): 2.5 TABLET ORAL at 23:16

## 2019-01-01 RX ADMIN — AMLODIPINE BESYLATE 5 MILLIGRAM(S): 2.5 TABLET ORAL at 06:34

## 2019-01-01 RX ADMIN — INSULIN HUMAN 2: 100 INJECTION, SOLUTION SUBCUTANEOUS at 23:22

## 2019-01-01 RX ADMIN — Medication 50 GRAM(S): at 12:02

## 2019-01-01 RX ADMIN — WARFARIN SODIUM 5 MILLIGRAM(S): 2.5 TABLET ORAL at 21:44

## 2019-01-01 RX ADMIN — Medication 975 MILLIGRAM(S): at 06:34

## 2019-01-01 RX ADMIN — TAMSULOSIN HYDROCHLORIDE 0.4 MILLIGRAM(S): 0.4 CAPSULE ORAL at 22:40

## 2019-01-01 RX ADMIN — SODIUM CHLORIDE 50 MILLILITER(S): 9 INJECTION INTRAMUSCULAR; INTRAVENOUS; SUBCUTANEOUS at 17:13

## 2019-01-01 RX ADMIN — SODIUM CHLORIDE 500 MILLILITER(S): 9 INJECTION INTRAMUSCULAR; INTRAVENOUS; SUBCUTANEOUS at 12:57

## 2019-01-04 ENCOUNTER — MEDICATION RENEWAL (OUTPATIENT)
Age: 84
End: 2019-01-04

## 2019-02-06 ENCOUNTER — MEDICATION RENEWAL (OUTPATIENT)
Age: 84
End: 2019-02-06

## 2019-03-07 ENCOUNTER — FORM ENCOUNTER (OUTPATIENT)
Age: 84
End: 2019-03-07

## 2019-03-08 ENCOUNTER — OUTPATIENT (OUTPATIENT)
Dept: OUTPATIENT SERVICES | Facility: HOSPITAL | Age: 84
LOS: 1 days | End: 2019-03-08
Payer: MEDICARE

## 2019-03-08 ENCOUNTER — APPOINTMENT (OUTPATIENT)
Dept: FAMILY MEDICINE | Facility: CLINIC | Age: 84
End: 2019-03-08
Payer: MEDICARE

## 2019-03-08 ENCOUNTER — APPOINTMENT (OUTPATIENT)
Dept: RADIOLOGY | Facility: CLINIC | Age: 84
End: 2019-03-08

## 2019-03-08 VITALS
DIASTOLIC BLOOD PRESSURE: 79 MMHG | SYSTOLIC BLOOD PRESSURE: 151 MMHG | OXYGEN SATURATION: 88 % | WEIGHT: 185 LBS | HEIGHT: 72 IN | BODY MASS INDEX: 25.06 KG/M2 | TEMPERATURE: 97.2 F | HEART RATE: 90 BPM

## 2019-03-08 DIAGNOSIS — M25.561 PAIN IN RIGHT KNEE: ICD-10-CM

## 2019-03-08 DIAGNOSIS — M06.9 RHEUMATOID ARTHRITIS, UNSPECIFIED: ICD-10-CM

## 2019-03-08 DIAGNOSIS — E11.9 TYPE 2 DIABETES MELLITUS W/OUT COMPLICATIONS: ICD-10-CM

## 2019-03-08 DIAGNOSIS — I10 ESSENTIAL (PRIMARY) HYPERTENSION: ICD-10-CM

## 2019-03-08 PROCEDURE — 73562 X-RAY EXAM OF KNEE 3: CPT | Mod: 26,RT

## 2019-03-08 PROCEDURE — 83036 HEMOGLOBIN GLYCOSYLATED A1C: CPT | Mod: QW

## 2019-03-08 PROCEDURE — 73562 X-RAY EXAM OF KNEE 3: CPT

## 2019-03-08 PROCEDURE — 73564 X-RAY EXAM KNEE 4 OR MORE: CPT

## 2019-03-08 PROCEDURE — 73564 X-RAY EXAM KNEE 4 OR MORE: CPT | Mod: 26,RT

## 2019-03-08 PROCEDURE — 99214 OFFICE O/P EST MOD 30 MIN: CPT | Mod: 25

## 2019-03-08 RX ORDER — ERGOCALCIFEROL 1.25 MG/1
1.25 MG CAPSULE, LIQUID FILLED ORAL
Qty: 12 | Refills: 2 | Status: ACTIVE | COMMUNITY
Start: 2017-02-07 | End: 1900-01-01

## 2019-03-08 NOTE — ASSESSMENT
[FreeTextEntry1] : this is an 82-her old male with past medical history of paroxysmal  A. fib on anticoagulation (Coumadin), BPH, COPD, type 2 diabetes, erectile dysfunction, hypertension, osteopenia, vitamin D deficiency, presenting for INR check and prescription refills.\par \par Ortho/Rheum: h/o Osteopenia, Rheumatoid Arthritis. c/o Right knee pain\par -RIGHT knee effusion\par -Xray 4 views of RIGHT knee.\par -Tramadol 50 mg tid prn\par \par Cardiovascular: History hypertension, paroxysmal A. fib on Coumadin.\par -Will check PT/INR today.\par -Patient currently taking 5 mg of Coumadin daily.\par -Last INR 2.13\par -Blood pressure slightly elevated today ? pain. No changes made.\par -Continue amlodipine 5 mg once daily, continue with diltiazem 240 mg once a day, continue ramipril 5 mg once daily.\par -Diet and exercise as tolerated has been discussed with the patient.\par \par Endocrine: History of type 2 diabetes, vitamin D insufficiency.\par -Patient's last hemoglobin A1c 7.2, today in house 7.7\par -Continue Glipizide 5 mg 2 tablets twice a day.\par -Continue metformin 1000 mg q.12 hours. Continue Onglyza 5 mg once daily.\par -Continue vitamin C supplementation.\par -Continue to check blood sugars at least once daily.\par \par : History of BPH, erectile dysfunction.\par -Continue tamsulosin 0.4 mg daily.\par -Continue Viagra 100 mg one tablet one hour prior to sexual activity.\par -Patient stopped Avodart on his own.\par -Continued Vesicare 5 mg for possible OAB.\par \par Health care maintenance:\par -Last bone density done in February 2017.\par -Last colonoscopy screening as per patient October 2015 with no malignant lesions.\par -Patient did receive high-dose flu vaccine last year.\par -Patient received Prevnar 13 in January 2017.\par -Patient received Pneumovax vaccine in July 2018.\par -Patient will return to the office in 3 months for diabetes followup.

## 2019-03-08 NOTE — HISTORY OF PRESENT ILLNESS
[FreeTextEntry8] : 2 week h/o RIGHT knee pain which started after he had to crawl down the basement to fix a water pipe at home, when he crawled back up was experiencing some pain and swelling of the right knee. Pt states that today is the best day he has felt since, pain is now rated 5/10, exacerbated with movement. Pt also requests to have prescription refills on some of his medications.

## 2019-03-08 NOTE — HEALTH RISK ASSESSMENT
[No falls in past year] : Patient reported no falls in the past year [0] : 2) Feeling down, depressed, or hopeless: Not at all (0) [] : No [[id number]] : 0

## 2019-03-11 LAB
HBA1C MFR BLD HPLC: 7.7
INR PPP: 3.05 RATIO
PT BLD: 36.3 SEC

## 2019-03-18 ENCOUNTER — APPOINTMENT (OUTPATIENT)
Dept: FAMILY MEDICINE | Facility: CLINIC | Age: 84
End: 2019-03-18
Payer: MEDICARE

## 2019-03-18 VITALS
SYSTOLIC BLOOD PRESSURE: 128 MMHG | WEIGHT: 184 LBS | HEART RATE: 97 BPM | DIASTOLIC BLOOD PRESSURE: 56 MMHG | OXYGEN SATURATION: 95 % | BODY MASS INDEX: 24.92 KG/M2 | HEIGHT: 72 IN | TEMPERATURE: 97.9 F

## 2019-03-18 DIAGNOSIS — I48.0 PAROXYSMAL ATRIAL FIBRILLATION: ICD-10-CM

## 2019-03-18 DIAGNOSIS — Z79.01 LONG TERM (CURRENT) USE OF ANTICOAGULANTS: ICD-10-CM

## 2019-03-18 PROCEDURE — 36415 COLL VENOUS BLD VENIPUNCTURE: CPT

## 2019-03-18 PROCEDURE — 99213 OFFICE O/P EST LOW 20 MIN: CPT | Mod: 25

## 2019-03-18 NOTE — HEALTH RISK ASSESSMENT
[No falls in past year] : Patient reported no falls in the past year [0] : 2) Feeling down, depressed, or hopeless: Not at all (0) [] : No [[id number]] : 0

## 2019-03-18 NOTE — ASSESSMENT
[FreeTextEntry1] : this is an 82-her old male with past medical history of paroxysmal  A. fib on anticoagulation (Coumadin), BPH, COPD, type 2 diabetes, erectile dysfunction, hypertension, osteopenia, vitamin D deficiency, presenting for follow up on Knee swelling and INR.\par \par Ortho/Rheum: h/o Osteopenia, Rheumatoid Arthritis. c/o Right knee pain\par -RIGHT knee effusion\par -Xray 4 views of RIGHT knee showing small right effusion.\par -Continue Tramadol 50 mg tid prn\par -Orthopedic surgery referral if knee swelling does not improve.\par \par Cardiovascular: History hypertension, paroxysmal A. fib on Coumadin.\par -Will check PT/INR today.\par -Patient currently taking 5 mg of Coumadin daily.\par -Last INR 3.05\par -Blood pressure much improved, back to baseline.\par -Continue amlodipine 5 mg once daily, continue with diltiazem 240 mg once a day, continue ramipril 5 mg once daily.\par -Diet and exercise as tolerated has been discussed with the patient.\par

## 2019-03-18 NOTE — PHYSICAL EXAM
[No Acute Distress] : no acute distress [Well Nourished] : well nourished [Well Developed] : well developed [Well-Appearing] : well-appearing [No Respiratory Distress] : no respiratory distress  [Clear to Auscultation] : lungs were clear to auscultation bilaterally [No Accessory Muscle Use] : no accessory muscle use [Normal S1, S2] : normal S1 and S2 [Normal Rate] : normal rate  [No Murmur] : no murmur heard [No Rash] : no rash [de-identified] : Irregularly irregular. [de-identified] : Swelling of RIGHT knee essentially unchanged fro previous visit, effusion felt on anterior portion and superior portion of the knee.

## 2019-03-18 NOTE — HISTORY OF PRESENT ILLNESS
[FreeTextEntry1] : knee pain [de-identified] : Pt presents for follow up on  Knee pain, recently had X-rays which only showed a small knee effusion, no other abnormalities aside from age related changes. Pt still c/o pain but states that he thinks swelling is getting smaller.

## 2019-03-19 LAB
INR PPP: 2 RATIO
PT BLD: 23.3 SEC

## 2019-04-02 ENCOUNTER — MEDICATION RENEWAL (OUTPATIENT)
Age: 84
End: 2019-04-02

## 2019-09-04 PROBLEM — M25.40 SWOLLEN JOINT: Status: ACTIVE | Noted: 2019-01-01

## 2019-09-04 PROBLEM — R07.89 STERNOCOSTAL PAIN: Status: ACTIVE | Noted: 2019-01-01

## 2019-09-05 NOTE — HISTORY OF PRESENT ILLNESS
[Spouse] : spouse [FreeTextEntry8] : Pt presents complaining of worsening LEFT knee pain for which he had an xray which showed small knee effusion back in March of this year. Pt also c/o Pain in his chest, mostly his mid rib area, denies any trauma, pain elicited on movement and on palpation. Denies any sob, dizziness or palpitations.

## 2019-09-05 NOTE — ASSESSMENT
[FreeTextEntry1] : Effusion of right knee: Orthopedic referral given for possible drainage\par \par Sternocostal pain: Sternal Xray ordered, no need for intervention at this time, will follow up after imaging.\par \par COPD: Stable, no exacerbations reported, Smoking cessation discussed. Continue Advair, ProAir

## 2019-09-05 NOTE — COUNSELING
[Fall prevention counseling provided] : Fall prevention counseling provided [Risk of tobacco use and health benefits of smoking cessation discussed] : Risk of tobacco use and health benefits of smoking cessation discussed [AUDIT-C Screening administered and reviewed] : AUDIT-C Screening administered and reviewed [None] : None [Good understanding] : Patient has a good understanding of lifestyle changes and steps needed to achieve self management goal

## 2019-09-05 NOTE — PHYSICAL EXAM
[Normal] : normal rate, regular rhythm, normal S1 and S2 and no murmur heard [de-identified] : There is a large LEFT knee effusion noted on the area above the patella and involving the surrounding tissue. There is also a hard lump in the sternocostal area of the 6 th rib with no signs of trauma but pain to palpation.

## 2019-09-05 NOTE — HEALTH RISK ASSESSMENT
[] : Yes [30 or more] : 30 or more [No] : In the past 12 months have you used drugs other than those required for medical reasons? No [No falls in past year] : Patient reported no falls in the past year [0] : 2) Feeling down, depressed, or hopeless: Not at all (0) [Audit-CScore] : 0 [de-identified] : none [de-identified] : regular [[id number]] : 0

## 2019-09-18 NOTE — PROCEDURE
[de-identified] : I aspirated 85 cc's of clear yellow synovial fluid from the patient's left knee \par \par I injected the patient's left knee today with cortisone.\par \par I discussed at length with the patient the planned steroid and lidocaine injection. The risks, benefits, convalescence and alternatives were reviewed. The possible side effects discussed included but were not limited to: pain, swelling, heat, bleeding, and redness. Symptoms are generally mild but if they are extensive then contact the office. Giving pain relievers by mouth such as NSAIDs or Tylenol can generally treat the reactions to steroid and lidocaine. Rare cases of infection have been noted. Rash, hives and itching may occur post injection. If you have muscle pain or cramps, flushing and or swelling of the face, rapid heart beat, nausea, dizziness, fever, chills, headache, difficulty breathing, swelling in the arms or legs, or have a prickly feeling of your skin, contact a health care provider immediately. Following this discussion, the knee was prepped with Alcohol and under sterile condition the 80 mg Depo-Medrol and 6 cc Lidocaine injection was performed with a 20 gauge needle through a superolateral injection site. The needle was introduced into the joint, aspiration was performed to ensure intra-articular placement and the medication was injected. Upon withdrawal of the needle the site was cleaned with alcohol and a band aid applied. The patient tolerated the injection well and there were no adverse effects. Post injection instructions included no strenuous activity for 24 hours, cryotherapy and if there are any adverse effects to contact the office. \par \par \par

## 2019-09-18 NOTE — DISCUSSION/SUMMARY
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 Based on imaging he is a candidate for left TKA, and I discussed with him that total knee replacement would be the best option for long-term pain relief. He defers pursuing surgery at this time as he is worried about his age. Today he elected to have his left knee aspirated and to receive a left knee cortisone injection. He tolerated both procedures well. He may F/U PRN.\par \par A knee replacement means resurfacing of all 3 surfaces of the patella, the femur, and tibia with metal and plastic parts. The prosthetic parts are usually cemented into position and well outpatient range of motion from full extension to about 120° of flexion. The postoperative motion, however, is determined by multiple factors, the most important of which is preoperative motion. In general, the better motion preoperatively, the better the motion postoperatively. The operation, pending medical clearance, gently requires hospitalization of 3-4 days for one knee, 5-7 days for bilateral knee replacements. In general, we prefer to perform the procedure under spinal anesthesia with femoral nerve block and occasional single shot sciatic nerve block. We may ask a patient to give 2 units of blood for bilateral total knee arthroplasties, for one knee we institute a normogram which may include administration of preoperative Procrit. The operative procedure takes probably 1-2 hours. The operation requires a straight incision anywhere from 5-7 inches down from the knee. Postoperatively, the patient is positioned in a continuous passive motion machine within the first 24 hours and is walking the day after surgery. The first couple days are very painful and the pain medication will alleviate, but not eliminate the pain. The patient must really push hard to get range of motion. Our goal for having a person go home as that the range of motion is approximately 0-90° of flexion and that they can walk with a walker or cane. A walking aid is to be dispensed once the patient is secure enough. In general there, there is no cast or brace required with routine knee replacement. In the long term, we do not encourage our patients to run for the sake of running, although pending their preoperative status, we often allow patient to play doubles tennis or comparative activities. We also allowed them to do gentle intermediate downhill skiing if they are truly an expert skier. Biking is encouraged as well swimming. The followup periods are usually 3 weeks, 6 weeks, 3 months, and yearly intervals. Potential complications with total knee replacement included anesthetic complications and death, infection around 1%, nerve damage, by which means peroneal nerve palsy, footdrop or flapping foot with ambulation. This is particularly more apt to occur in the patient with a valgus or knock-knee deformity. The incidence can be quite high in this particular patient population. There will be areas of skin numbness, but this is not an untoward effect nor do we consider it a complication. Other potential complications include dislocation of the patella component, usually less than 2%; loosening of the tibial or femoral component is much more infrequent. Most often this occurs with infection or long-term use. Patient of extreme risk including markedly overweight patient's may be more prone to prosthetic wear. Major blood vessel damage is also extremely rare. Directly because of the anatomic proximity of the popliteal artery this could be lacerated with subsequent repair required. Be it unlikely, disruption of the popliteal artery could theoretically result in amputation. Similarly, infection could theoretically result in amputation if one were to grow out of an organism that cannot be controlled with antibiotics. General medical complications include phlebitis, for which we would prophylactically anticoagulate patients, but could still occur, and fatal pulmonary embolus which has been reported. Cardiovascular problems, such as heart attack or ischemia are always a concern with such hemodynamic changes in the blood vascular system. Other General complications are rare, but anything medicine could theoretically happen. I think the patient understands the risk benefit ratio of total knee replacement and will think about whether there like to pursue with an operation or nonoperative treatment program. I reviewed the plan of care as well as a model of a total knee implant equivalent to the one that will be used for their total knee joint replacement. The patient agreed to the plan of care as well as the use of implants for their knee total joint replacement.

## 2019-09-18 NOTE — DISCUSSION/SUMMARY
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 Based on imaging he is a candidate for left TKA, and I discussed with him that total knee replacement would be the best option for long-term pain relief. He defers pursuing surgery at this time as he is worried about his age. Today he elected to have his left knee aspirated and to receive a left knee cortisone injection. He tolerated both procedures well. He may F/U PRN.\par \par A knee replacement means resurfacing of all 3 surfaces of the patella, the femur, and tibia with metal and plastic parts. The prosthetic parts are usually cemented into position and well outpatient range of motion from full extension to about 120° of flexion. The postoperative motion, however, is determined by multiple factors, the most important of which is preoperative motion. In general, the better motion preoperatively, the better the motion postoperatively. The operation, pending medical clearance, gently requires hospitalization of 3-4 days for one knee, 5-7 days for bilateral knee replacements. In general, we prefer to perform the procedure under spinal anesthesia with femoral nerve block and occasional single shot sciatic nerve block. We may ask a patient to give 2 units of blood for bilateral total knee arthroplasties, for one knee we institute a normogram which may include administration of preoperative Procrit. The operative procedure takes probably 1-2 hours. The operation requires a straight incision anywhere from 5-7 inches down from the knee. Postoperatively, the patient is positioned in a continuous passive motion machine within the first 24 hours and is walking the day after surgery. The first couple days are very painful and the pain medication will alleviate, but not eliminate the pain. The patient must really push hard to get range of motion. Our goal for having a person go home as that the range of motion is approximately 0-90° of flexion and that they can walk with a walker or cane. A walking aid is to be dispensed once the patient is secure enough. In general there, there is no cast or brace required with routine knee replacement. In the long term, we do not encourage our patients to run for the sake of running, although pending their preoperative status, we often allow patient to play doubles tennis or comparative activities. We also allowed them to do gentle intermediate downhill skiing if they are truly an expert skier. Biking is encouraged as well swimming. The followup periods are usually 3 weeks, 6 weeks, 3 months, and yearly intervals. Potential complications with total knee replacement included anesthetic complications and death, infection around 1%, nerve damage, by which means peroneal nerve palsy, footdrop or flapping foot with ambulation. This is particularly more apt to occur in the patient with a valgus or knock-knee deformity. The incidence can be quite high in this particular patient population. There will be areas of skin numbness, but this is not an untoward effect nor do we consider it a complication. Other potential complications include dislocation of the patella component, usually less than 2%; loosening of the tibial or femoral component is much more infrequent. Most often this occurs with infection or long-term use. Patient of extreme risk including markedly overweight patient's may be more prone to prosthetic wear. Major blood vessel damage is also extremely rare. Directly because of the anatomic proximity of the popliteal artery this could be lacerated with subsequent repair required. Be it unlikely, disruption of the popliteal artery could theoretically result in amputation. Similarly, infection could theoretically result in amputation if one were to grow out of an organism that cannot be controlled with antibiotics. General medical complications include phlebitis, for which we would prophylactically anticoagulate patients, but could still occur, and fatal pulmonary embolus which has been reported. Cardiovascular problems, such as heart attack or ischemia are always a concern with such hemodynamic changes in the blood vascular system. Other General complications are rare, but anything medicine could theoretically happen. I think the patient understands the risk benefit ratio of total knee replacement and will think about whether there like to pursue with an operation or nonoperative treatment program. I reviewed the plan of care as well as a model of a total knee implant equivalent to the one that will be used for their total knee joint replacement. The patient agreed to the plan of care as well as the use of implants for their knee total joint replacement.

## 2019-09-18 NOTE — PHYSICAL EXAM
[LE] : 5/5 motor strength in bilateral lower extremities [ALL] : dorsalis pedis, posterior tibial, femoral, popliteal, and radial 2+ and symmetric bilaterally [Normal] : Alert and in no acute distress [Obese] : obese [Antalgic] : not antalgic [Poor Appearance] : well-appearing [Acute Distress] : not in acute distress [de-identified] : GENERAL APPEARANCE: Well nourished and hydrated, pleasant, alert, and oriented x 3. Appears their stated age. \par HEENT: Normocephalic, extraocular eye motion intact. Nasal septum midline. Oral cavity clear. External auditory canal clear. \par RESPIRATORY: Breath sounds clear and audible in all lobes. No wheezing, No accessory muscle use.\par CARDIOVASCULAR: No apparent abnormalities. No lower leg edema. No varicosities. Pedal pulses are palpable.\par NEUROLOGIC: Sensation is normal, no muscle weakness in the upper or lower extremities.\par DERMATOLOGIC: No apparent skin lesions, moist, warm, no rash.\par SPINE: Cervical spine appears normal and moves freely; thoracic spine appears normal and moves freely; lumbosacral spine appears normal and moves freely, normal, nontender.\par MUSCULOSKELETAL: Hands, wrists, and elbows are normal and move freely, shoulders are normal and move freely.  [de-identified] : Left knee exam shows large joint effusion, 5 to 7 degree flexion contracture, 110 degrees of flexion, slight varus alignment, crepitus with ROM. [de-identified] : 3V Xray of the left knee done in office today and reviewed by Dr. 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

## 2019-09-18 NOTE — HISTORY OF PRESENT ILLNESS
[Worsening] : worsening [___ yrs] : [unfilled] year(s) ago [6] : a current pain level of 6/10 [Constant] : ~He/She~ states the symptoms seem to be constant [Walking] : worsened by walking [Bending] : worsened by bending [Rest] : relieved by rest [Recumbency] : relieved by recumbency [de-identified] : Patient is a 84 year old male who presents c/o long-standing left knee pain. He states that pain is diffuse throughout the left knee. He also c/o swelling to the left knee. He reports that he has attempted hyaluronic acid injections ~7 years ago. He does not use any medication for pain relief. Pain is worse with activity. Pain is better with rest. He wishes to discuss his treatment options, but is not interested in surgery due to his age.

## 2019-09-18 NOTE — PHYSICAL EXAM
[LE] : 5/5 motor strength in bilateral lower extremities [ALL] : dorsalis pedis, posterior tibial, femoral, popliteal, and radial 2+ and symmetric bilaterally [Normal] : Alert and in no acute distress [Obese] : obese [Poor Appearance] : well-appearing [Antalgic] : not antalgic [Acute Distress] : not in acute distress [de-identified] : GENERAL APPEARANCE: Well nourished and hydrated, pleasant, alert, and oriented x 3. Appears their stated age. \par HEENT: Normocephalic, extraocular eye motion intact. Nasal septum midline. Oral cavity clear. External auditory canal clear. \par RESPIRATORY: Breath sounds clear and audible in all lobes. No wheezing, No accessory muscle use.\par CARDIOVASCULAR: No apparent abnormalities. No lower leg edema. No varicosities. Pedal pulses are palpable.\par NEUROLOGIC: Sensation is normal, no muscle weakness in the upper or lower extremities.\par DERMATOLOGIC: No apparent skin lesions, moist, warm, no rash.\par SPINE: Cervical spine appears normal and moves freely; thoracic spine appears normal and moves freely; lumbosacral spine appears normal and moves freely, normal, nontender.\par MUSCULOSKELETAL: Hands, wrists, and elbows are normal and move freely, shoulders are normal and move freely.  [de-identified] : Left knee exam shows large joint effusion, 5 to 7 degree flexion contracture, 110 degrees of flexion, slight varus alignment, crepitus with ROM. [de-identified] : 3V Xray of the left knee done in office today and reviewed by Dr. 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

## 2019-09-18 NOTE — ADDENDUM
[FreeTextEntry1] : I, Vin Molina, acted solely as a scribe for Dr. Harshil Trinh on this date 09/18/2019.

## 2019-09-18 NOTE — PROCEDURE
[de-identified] : I aspirated 85 cc's of clear yellow synovial fluid from the patient's left knee \par \par I injected the patient's left knee today with cortisone.\par \par I discussed at length with the patient the planned steroid and lidocaine injection. The risks, benefits, convalescence and alternatives were reviewed. The possible side effects discussed included but were not limited to: pain, swelling, heat, bleeding, and redness. Symptoms are generally mild but if they are extensive then contact the office. Giving pain relievers by mouth such as NSAIDs or Tylenol can generally treat the reactions to steroid and lidocaine. Rare cases of infection have been noted. Rash, hives and itching may occur post injection. If you have muscle pain or cramps, flushing and or swelling of the face, rapid heart beat, nausea, dizziness, fever, chills, headache, difficulty breathing, swelling in the arms or legs, or have a prickly feeling of your skin, contact a health care provider immediately. Following this discussion, the knee was prepped with Alcohol and under sterile condition the 80 mg Depo-Medrol and 6 cc Lidocaine injection was performed with a 20 gauge needle through a superolateral injection site. The needle was introduced into the joint, aspiration was performed to ensure intra-articular placement and the medication was injected. Upon withdrawal of the needle the site was cleaned with alcohol and a band aid applied. The patient tolerated the injection well and there were no adverse effects. Post injection instructions included no strenuous activity for 24 hours, cryotherapy and if there are any adverse effects to contact the office. \par \par \par

## 2019-09-18 NOTE — HISTORY OF PRESENT ILLNESS
[Worsening] : worsening [___ yrs] : [unfilled] year(s) ago [6] : a current pain level of 6/10 [Constant] : ~He/She~ states the symptoms seem to be constant [Bending] : worsened by bending [Walking] : worsened by walking [Rest] : relieved by rest [Recumbency] : relieved by recumbency [de-identified] : Patient is a 84 year old male who presents c/o long-standing left knee pain. He states that pain is diffuse throughout the left knee. He also c/o swelling to the left knee. He reports that he has attempted hyaluronic acid injections ~7 years ago. He does not use any medication for pain relief. Pain is worse with activity. Pain is better with rest. He wishes to discuss his treatment options, but is not interested in surgery due to his age.

## 2019-11-03 NOTE — ED PROVIDER NOTE - OBJECTIVE STATEMENT
83 y/o M with PMHx of DM presents to ED c/o generalized weakness and fall that occurred today at 5:00 AM. Pt was walking to the bathroom and fell while in the bathroom. Pt's wife states it took her 20-25 minutes to get him off the floor. According to his wife, pt did not hit his head when he fell. Pt reports HA that has since subsided. Pt ambulates without assistance at baseline. Pt admits to taking warfarin. denies fever or neck pain. no chest pain or sob. no abd pain. no n/v/d. no urinary f/u/d. no back pain. no motor or sensory deficits. denies illicit drug use. no recent travel. no rash. no other acute issues symptoms or concerns.

## 2019-11-03 NOTE — ED PROVIDER NOTE - CLINICAL SUMMARY MEDICAL DECISION MAKING FREE TEXT BOX
need for medical admission wife describes acute confusion walking outside front door thinking its his bedroom need for mr head rule out microhemorrhages rule out cva unsafe dispo home unable to walk correct knee pain componenet plus possible acute meidcal issues pt is too high risk for decompensation if sent home pt and pts wife as well as daughter in  medical care I d/w and they are in agreement with plan of care

## 2019-11-03 NOTE — ED ADULT TRIAGE NOTE - CHIEF COMPLAINT QUOTE
pt with weakness, headache dizziness frequent falls for the past few days. pt currently alert and oriented.

## 2019-11-04 NOTE — H&P ADULT - ATTENDING COMMENTS
I spent 70 minutes in patient encounter, greater than 50% of the time was spent in counseling/coordination of care.

## 2019-11-04 NOTE — PHYSICAL THERAPY INITIAL EVALUATION ADULT - CRITERIA FOR SKILLED THERAPEUTIC INTERVENTIONS
rehab potential/functional limitations in following categories/impairments found/therapy frequency/anticipated discharge recommendation/predicted duration of therapy intervention

## 2019-11-04 NOTE — PHYSICAL THERAPY INITIAL EVALUATION ADULT - PERTINENT HX OF CURRENT PROBLEM, REHAB EVAL
pt presents to [facility] due to left sided weakness, s/p fall, HTN encephalopathy, possible TIA, cavernoma left cerebellar hemisphere

## 2019-11-04 NOTE — H&P ADULT - ASSESSMENT
85y/o M with PMHx of pAfib on coumadin, DM type 2, HTN, OAB, BPH, RA presents to ED c/o headache and left sided weakness admitted for TIA/CVA and ct showing area of hypodensities unclear if mets or micro hemorrhage recommend MRI

## 2019-11-04 NOTE — H&P ADULT - PROBLEM SELECTOR PLAN 2
EKG NSR  con with Cardizem 240mg CD with holding parameter for rate control   On coumadin, INR supratherapeutic 3.1   f/u daily INR, hold coumadin for now

## 2019-11-04 NOTE — H&P ADULT - NSHPSOCIALHISTORY_GEN_ALL_CORE
current smoker less then 1ppd daily   denies etoh use   denies substance abuse   lives with wife   ambulate without assistance

## 2019-11-04 NOTE — PHYSICAL THERAPY INITIAL EVALUATION ADULT - GAIT PATTERN USED, PT EVAL
pt requiring max verbal and tactile cues for sequencing, unsteadiness throughout, decreased em step length, decreased upward gaze

## 2019-11-04 NOTE — H&P ADULT - PROBLEM SELECTOR PLAN 1
-no acute focal deficit on exam, NIH score of 0  -Ct head showed Rounded focal hyperdensity measuring up to 8 mm in the region of the left carotid terminus at the level of the left mamillary body/anterior fornix and additional rounded hyperdensity in the left cerebellum measuring up to 5   mm unclear if mets or microhemorrhage recommended MRI    -F/u MRI, MRA head and neck   -started on atorvastatin, hold asa for ?microhemorrhage   -f/u Lipid panel   -Neuro consult placed by ED

## 2019-11-04 NOTE — CONSULT NOTE ADULT - ASSESSMENT
The patient is a 84y Male with headache and possibly some transient left sided weakness.     Headache.   Now much improved.   Will check ESR.     TIA.   Possible TIA given presentation.  Has atrial fibrillation.   He is therapeutic on Coumadin.   Continue statin.   Agree with checking lipid panel.   Mobilize with physical therapy.     Case discussed with Dr Loja.

## 2019-11-04 NOTE — H&P ADULT - NSHPPHYSICALEXAM_GEN_ALL_CORE
T(C): 36.8 (11-03-19 @ 19:31), Max: 36.8 (11-03-19 @ 19:31)  HR: 104 (11-03-19 @ 19:31) (104 - 104)  BP: 163/87 (11-03-19 @ 19:31) (163/87 - 163/87)  RR: 20 (11-03-19 @ 19:31) (20 - 20)  SpO2: 94% (11-03-19 @ 19:31) (94% - 94%)    GENERAL: patient appears well, no acute distress, appropriate, pleasant  EYES: sclera clear, no exudates  ENMT: oropharynx clear without erythema, no exudates, moist mucous membranes  NECK: supple, soft, no thyromegaly noted  LUNGS: good air entry bilaterally, clear to auscultation, symmetric breath sounds, no wheezing or rhonchi appreciated  HEART: soft S1/S2, regular rate and rhythm, no murmurs noted, no lower extremity edema  GASTROINTESTINAL: abdomen is soft, nontender, nondistended, normoactive bowel sounds, no palpable masses  INTEGUMENT: good skin turgor, warm skin, appears well perfused  MUSCULOSKELETAL: left knee effusion, no warmth or erythema   NEUROLOGIC: awake, alert, oriented x3, good muscle tone in 4 extremities, no obvious sensory deficits or focal deficit   PSYCHIATRIC: mood is good, affect is congruent, linear and logical thought process  HEME/LYMPH: no palpable supraclavicular nodules, no obvious ecchymosis or petechiae

## 2019-11-04 NOTE — H&P ADULT - PROBLEM SELECTOR PLAN 7
with left knee effusion s/p aspiration and cortisone injection outpt   cont with tramadol for pain control

## 2019-11-04 NOTE — H&P ADULT - HISTORY OF PRESENT ILLNESS
83y/o M with PMHx of pAfib on coumadin, DM type 2, HTN, OAB, BPH, RA presents to ED c/o headache and left sided weakness. Pt state he got up to walk to the bathroom around 5am, felt weak on his left knee then fell to the ground, and took him a while to get up, denies head trauma or LOC. pt admits to left arm weakness at the time but denies slurred speech, numbness, cp, sob, admits to feeling lightheadedness but no syncope. Pt admits to feeling unstable o his feet at baseline, ambulates without assistance. He has chronic left knee pain with effusion s/p joint aspiration and cortisone injection last week. Denies fever, chills, n/v, dysuria.

## 2019-11-04 NOTE — H&P ADULT - NSICDXPASTMEDICALHX_GEN_ALL_CORE_FT
PAST MEDICAL HISTORY:  Afib     BPH (benign prostatic hyperplasia)     COPD (chronic obstructive pulmonary disease) with emphysema     DM (diabetes mellitus)     Hypertension     Overactive bladder     Rheumatoid arthritis

## 2019-11-04 NOTE — PROGRESS NOTE ADULT - SUBJECTIVE AND OBJECTIVE BOX
84 year old male PMH pAfib on coumadin, DM-type 2, HTN, OAB, BPH, RA presents to ED c/o HA, left sided weakness.   Admitted this am. Pt seen and examined.   Pt lethargic at time of exam, intermittently following commands.   Denies CP, SOB, n/v, abd pain. admits to HA but says he feels some improvement since coming to ER.     On exam:   CONSTITUTIONAL:  NAD  EYES: PERRL  CARDIAC: Regular rate, regular rhythm.  normal +S1, S2.  No murmurs, rubs or gallops.  RESPIRATORY: Clear to auscultation bilaterally, no wheezes noted  GASTROINTESTINAL: Abdomen soft, non-tender, no guarding.  NEUROLOGICAL: drowsy, slow to respond, intermittently following commands/answering questions; moving all 4 extremities with no focal deficit  SKIN: warm, dry, no rashes noted    < from: MR Angio Neck w/ IV Cont (11.04.19 @ 09:35) >  IMPRESSION:  Unremarkable contrast-enhanced MRA of the neck.    < end of copied text >  < from: MR Head w/ IV Cont (11.04.19 @ 09:34) >  IMPRESSION: 5 mm left cerebellar hemisphere cavernoma. 7 mm   susceptibility artifact in the left aspect of the suprasellar   cistern/hypothalamus without enhancement which may represent a partially   calcified lesion. Differential diagnosis includes left hypothalamic   lesion, cavernoma, thrombosed aneurysm, and metastasis.    < end of copied text >

## 2019-11-04 NOTE — PATIENT PROFILE ADULT - NSPROEDALEARNPREF_GEN_A_NUR
video/verbal instruction/written material/pictorial/skill demonstration/audio/group instruction/computer/internet/individual instruction

## 2019-11-05 NOTE — PROGRESS NOTE ADULT - SUBJECTIVE AND OBJECTIVE BOX
[facility]                                        [facility] [place]                                 Cyn Huffman, & Oswaldo                                  [address]                                        [place], [place], [address]                                             [phone]        CC: TIA and headache     HPI:   The patient is a 84y Male who reported some left sided headache and weakness which began yesterday. The weakness has resolved. It is not clear how long it lasted.   He reports some mild residual headache but this has nearly resolved as well.   He has atrial fibrillation and is on anticoagulation with Coumadin.     Interim history:  Now on 4 [place].   No further headache.    ROS:   Denies headache or dizziness.  Denies chest pain.  Denies shortness of breath.    MEDICATIONS  (STANDING):  amLODIPine   Tablet 5 milliGRAM(s) Oral daily  aspirin  chewable 81 milliGRAM(s) Oral daily  atorvastatin 20 milliGRAM(s) Oral at bedtime  budesonide 160 MICROgram(s)/formoterol 4.5 MICROgram(s) Inhaler 2 Puff(s) Inhalation two times a day  dextrose 5%. 1000 milliLiter(s) (50 mL/Hr) IV Continuous <Continuous>  dextrose 50% Injectable 12.5 Gram(s) IV Push once  dextrose 50% Injectable 25 Gram(s) IV Push once  dextrose 50% Injectable 25 Gram(s) IV Push once  diltiazem    milliGRAM(s) Oral daily  finasteride 5 milliGRAM(s) Oral daily  insulin lispro (HumaLOG) corrective regimen sliding scale   SubCutaneous three times a day before meals  nicotine -  14 mG/24Hr(s) Patch 1 patch Transdermal daily  tamsulosin 0.4 milliGRAM(s) Oral at bedtime  traMADol 50 milliGRAM(s) Oral daily  warfarin 5 milliGRAM(s) Oral at bedtime      Vital Signs Last 24 Hrs  T(C): 36.6 (05 Nov 2019 05:16), Max: 36.7 (04 Nov 2019 17:45)  T(F): 97.8 (05 Nov 2019 05:16), Max: 98 (04 Nov 2019 17:45)  HR: 98 (05 Nov 2019 05:16) (92 - 110)  BP: 138/72 (05 Nov 2019 05:16) (138/72 - 180/84)  BP(mean): 107 (04 Nov 2019 17:12) (107 - 107)  RR: 17 (05 Nov 2019 05:16) (16 - 27)  SpO2: 98% (05 Nov 2019 05:16) (92% - 98%)    No tenderness over the temporal arteries.     Detailed Neurologic Exam:    Mental status: The patient is awake and alert. There is no aphasia. There is no dysarthria.     Cranial nerves: Pupils equal and react symmetrically to light. There is no visual field deficit to threat. Extraocular motion is full with no nystagmus. There is no ptosis. Facial sensation is intact. Facial musculature is symmetric. Palate elevates symmetrically. Tongue is midline.    Motor: There is normal bulk and tone.  There is no tremor.  Strength grossly 5/5 bilaterally.    Sensation: Grossly intact to light touch and pin.    Reflexes: 2+ throughout and plantar responses are flexor.    Cerebellar: No dysmetria on finger nose testing.    Labs:     11-05    142  |  104  |  13.0  ----------------------------<  161<H>  3.6   |  25.0  |  0.86    Ca    9.7      05 Nov 2019 06:08    TPro  8.4  /  Alb  3.9  /  TBili  0.3<L>  /  DBili  x   /  AST  17  /  ALT  11  /  AlkPhos  56  11-05                        11.7   6.17  )-----------( 215      ( 05 Nov 2019 06:08 )             35.7     Sedimentation Rate, Erythrocyte: 49 mm/hr (11.04.19 @ 20:54)    Prior rad:  CT head shows a rounded hyper-density at the left carotid terminus.   MRI suggested this to be a calcification. There was also a 5 mm cavernoma in the left cerebellar hemisphere.   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  MR Angio of the brain was unremarkable. There was no sign of thrombosed aneurysm (which was also in the differential diagnosis of the calcification).

## 2019-11-05 NOTE — PROGRESS NOTE ADULT - SUBJECTIVE AND OBJECTIVE BOX
CC: confusion     INTERVAL HPI/OVERNIGHT EVENTS: seen and examined , mentation has improved . seen by PT recs were for acute rehab . discussed with neuro no indication for LP. ESR was not impressive for his age either .    REVIEW OF SYSTEMS:    CONSTITUTIONAL: No fever, weight loss, or fatigue  RESPIRATORY: No cough, wheezing, hemoptysis; No shortness of breath  CARDIOVASCULAR: No chest pain, palpitations  GASTROINTESTINAL: No abdominal or epigastric pain. No nausea, vomiting  NEUROLOGICAL: headache on and off , confusion       Vital Signs Last 24 Hrs  T(C): 37.3 (2019 15:45), Max: 37.3 (2019 15:45)  T(F): 99.1 (2019 15:45), Max: 99.1 (2019 15:45)  HR: 89 (2019 15:45) (89 - 101)  BP: 141/76 (2019 15:45) (137/68 - 180/84)  BP(mean): --  RR: 17 (2019 15:45) (16 - 18)  SpO2: 93% (2019 15:45) (90% - 98%)    PHYSICAL EXAM:    GENERAL: NAD, resting comfortable in bed   CHEST/LUNG: fair air entry BL.   HEART: S1S2+, Regular rate and rhythm; No murmurs, rubs, or gallops  ABDOMEN: Soft, Nontender, Nondistended; Bowel sounds present  EXTREMITIES:  no pitting edema , pulses palpated BL.    LABS:                        11.7   6.17  )-----------( 215      ( 2019 06:08 )             35.7         142  |  104  |  13.0  ----------------------------<  161<H>  3.6   |  25.0  |  0.86    Ca    9.7      2019 06:08    TPro  8.4  /  Alb  3.9  /  TBili  0.3<L>  /  DBili  x   /  AST  17  /  ALT  11  /  AlkPhos  56  11-    PT/INR - ( 2019 06:08 )   PT: 26.3 sec;   INR: 2.23 ratio         PTT - ( 2019 06:08 )  PTT:34.9 sec  Urinalysis Basic - ( 2019 07:23 )    Color: Yellow / Appearance: Clear / S.015 / pH: x  Gluc: x / Ketone: Negative  / Bili: Negative / Urobili: Negative mg/dL   Blood: x / Protein: 100 mg/dL / Nitrite: Negative   Leuk Esterase: Negative / RBC: 3-5 /HPF / WBC 0-2   Sq Epi: x / Non Sq Epi: Occasional / Bacteria: Occasional          MEDICATIONS  (STANDING):  amLODIPine   Tablet 5 milliGRAM(s) Oral daily  aspirin  chewable 81 milliGRAM(s) Oral daily  atorvastatin 20 milliGRAM(s) Oral at bedtime  budesonide 160 MICROgram(s)/formoterol 4.5 MICROgram(s) Inhaler 2 Puff(s) Inhalation two times a day  dextrose 5%. 1000 milliLiter(s) (50 mL/Hr) IV Continuous <Continuous>  dextrose 50% Injectable 12.5 Gram(s) IV Push once  dextrose 50% Injectable 25 Gram(s) IV Push once  dextrose 50% Injectable 25 Gram(s) IV Push once  diltiazem    milliGRAM(s) Oral daily  finasteride 5 milliGRAM(s) Oral daily  insulin lispro (HumaLOG) corrective regimen sliding scale   SubCutaneous three times a day before meals  nicotine -  14 mG/24Hr(s) Patch 1 patch Transdermal daily  tamsulosin 0.4 milliGRAM(s) Oral at bedtime  traMADol 50 milliGRAM(s) Oral daily  warfarin 5 milliGRAM(s) Oral at bedtime    MEDICATIONS  (PRN):  acetaminophen   Tablet .. 650 milliGRAM(s) Oral every 6 hours PRN Moderate Pain (4 - 6)  ALBUTerol    90 MICROgram(s) HFA Inhaler 2 Puff(s) Inhalation every 6 hours PRN Shortness of Breath and/or Wheezing  dextrose 40% Gel 15 Gram(s) Oral once PRN Blood Glucose LESS THAN 70 milliGRAM(s)/deciliter  glucagon  Injectable 1 milliGRAM(s) IntraMuscular once PRN Glucose LESS THAN 70 milligrams/deciliter      RADIOLOGY & ADDITIONAL TESTS:

## 2019-11-06 NOTE — SWALLOW BEDSIDE ASSESSMENT ADULT - ASR SWALLOW ASPIRATION MONITOR
oral hygiene/position upright (89Y)/gurgly voice/pneumonia/fever/throat clearing/upper respiratory infection/change of breathing pattern/cough

## 2019-11-06 NOTE — SWALLOW BEDSIDE ASSESSMENT ADULT - SWALLOW EVAL: DIAGNOSIS
Oral stage grossly WFL. Pharyngeal dysphagia suspected for thin fluids with +cough immediately post intake. Pharyngeal stage clinically unremarkable for puree, mech soft, soft & nectar thick fluids

## 2019-11-06 NOTE — PROGRESS NOTE ADULT - SUBJECTIVE AND OBJECTIVE BOX
CC: encephalopathy     INTERVAL HPI/OVERNIGHT EVENTS: seen and examined , still has episodic confusion , no headache , as per wife patient has been forgetful for months , he has been using cane at home. ammonia ordered pending , abg reviewed , within acceptable limits .     REVIEW OF SYSTEMS:    CONSTITUTIONAL: feels tired   RESPIRATORY: No cough, wheezing, hemoptysis; No shortness of breath  CARDIOVASCULAR: No chest pain, palpitations  GASTROINTESTINAL: No abdominal or epigastric pain. No nausea, vomiting  NEUROLOGICAL: headache resolving , no blurry vision       Vital Signs Last 24 Hrs  T(C): 36.8 (06 Nov 2019 05:05), Max: 36.9 (05 Nov 2019 20:12)  T(F): 98.2 (06 Nov 2019 05:05), Max: 98.5 (05 Nov 2019 22:04)  HR: 105 (06 Nov 2019 08:44) (91 - 105)  BP: 148/77 (06 Nov 2019 05:05) (147/66 - 158/72)  BP(mean): --  RR: 18 (06 Nov 2019 05:05) (16 - 18)  SpO2: 97% (06 Nov 2019 08:44) (93% - 97%)    PHYSICAL EXAM:    GENERAL: NAD, resting comfortable in bed   HEENT: NC, AT , PERRLA , no conjunctival pallor   CHEST/LUNG: Clear to percussion bilaterally; No wheezing  HEART: S1S2+, Regular rate and rhythm; No murmurs, rubs, or gallops  ABDOMEN: Soft, Nontender, Nondistended; Bowel sounds present  EXTREMITIES:  no pitting edema , pulses palpated BL.  NEURO: AAOX3, no focal deficits, no motor or sensory loss  PSYCH: normal mood      LABS:                        11.7   8.39  )-----------( 226      ( 06 Nov 2019 08:56 )             36.1     11-06    140  |  102  |  19.0  ----------------------------<  230<H>  4.1   |  25.0  |  1.18    Ca    9.8      06 Nov 2019 08:56    TPro  8.4  /  Alb  3.9  /  TBili  0.3<L>  /  DBili  x   /  AST  17  /  ALT  11  /  AlkPhos  56  11-05    PT/INR - ( 06 Nov 2019 08:56 )   PT: 19.9 sec;   INR: 1.70 ratio         PTT - ( 05 Nov 2019 06:08 )  PTT:34.9 sec        MEDICATIONS  (STANDING):  amLODIPine   Tablet 5 milliGRAM(s) Oral daily  aspirin  chewable 81 milliGRAM(s) Oral daily  atorvastatin 20 milliGRAM(s) Oral at bedtime  budesonide 160 MICROgram(s)/formoterol 4.5 MICROgram(s) Inhaler 2 Puff(s) Inhalation two times a day  dextrose 5%. 1000 milliLiter(s) (50 mL/Hr) IV Continuous <Continuous>  dextrose 50% Injectable 12.5 Gram(s) IV Push once  dextrose 50% Injectable 25 Gram(s) IV Push once  dextrose 50% Injectable 25 Gram(s) IV Push once  diltiazem    milliGRAM(s) Oral daily  finasteride 5 milliGRAM(s) Oral daily  insulin glargine Injectable (LANTUS) 10 Unit(s) SubCutaneous at bedtime  insulin lispro (HumaLOG) corrective regimen sliding scale   SubCutaneous three times a day before meals  insulin lispro Injectable (HumaLOG) 10 Unit(s) SubCutaneous three times a day before meals  melatonin 5 milliGRAM(s) Oral at bedtime  nicotine -  14 mG/24Hr(s) Patch 1 patch Transdermal daily  tamsulosin 0.4 milliGRAM(s) Oral at bedtime  traMADol 50 milliGRAM(s) Oral daily  warfarin 7.5 milliGRAM(s) Oral at bedtime    MEDICATIONS  (PRN):  acetaminophen   Tablet .. 650 milliGRAM(s) Oral every 6 hours PRN Moderate Pain (4 - 6)  ALBUTerol    90 MICROgram(s) HFA Inhaler 2 Puff(s) Inhalation every 6 hours PRN Shortness of Breath and/or Wheezing  dextrose 40% Gel 15 Gram(s) Oral once PRN Blood Glucose LESS THAN 70 milliGRAM(s)/deciliter  glucagon  Injectable 1 milliGRAM(s) IntraMuscular once PRN Glucose LESS THAN 70 milligrams/deciliter      RADIOLOGY & ADDITIONAL TESTS: CC: encephalopathy     INTERVAL HPI/OVERNIGHT EVENTS: seen and examined , still has episodic confusion , no headache , as per wife patient has been forgetful for months , he has been using cane at home. ammonia ordered pending , abg reviewed , within acceptable limits .     REVIEW OF SYSTEMS:    CONSTITUTIONAL: feels tired   RESPIRATORY: No cough, wheezing, hemoptysis; No shortness of breath  CARDIOVASCULAR: No chest pain, palpitations  GASTROINTESTINAL: No abdominal or epigastric pain. No nausea, vomiting  NEUROLOGICAL: headache resolving , no blurry vision       Vital Signs Last 24 Hrs  T(C): 36.8 (06 Nov 2019 05:05), Max: 36.9 (05 Nov 2019 20:12)  T(F): 98.2 (06 Nov 2019 05:05), Max: 98.5 (05 Nov 2019 22:04)  HR: 105 (06 Nov 2019 08:44) (91 - 105)  BP: 148/77 (06 Nov 2019 05:05) (147/66 - 158/72)  BP(mean): --  RR: 18 (06 Nov 2019 05:05) (16 - 18)  SpO2: 97% (06 Nov 2019 08:44) (93% - 97%)    PHYSICAL EXAM:    GENERAL: NAD, resting comfortable in bed   CHEST/LUNG: diminished air entry but no wheezing , rales heard   HEART: S1S2+, Regular rate and rhythm; 2/6 systolic murmur   ABDOMEN: Soft, Nontender, Nondistended; Bowel sounds present  EXTREMITIES:  no pitting edema , pulses palpated BL.      LABS:                        11.7   8.39  )-----------( 226      ( 06 Nov 2019 08:56 )             36.1     11-06    140  |  102  |  19.0  ----------------------------<  230<H>  4.1   |  25.0  |  1.18    Ca    9.8      06 Nov 2019 08:56    TPro  8.4  /  Alb  3.9  /  TBili  0.3<L>  /  DBili  x   /  AST  17  /  ALT  11  /  AlkPhos  56  11-05    PT/INR - ( 06 Nov 2019 08:56 )   PT: 19.9 sec;   INR: 1.70 ratio         PTT - ( 05 Nov 2019 06:08 )  PTT:34.9 sec        MEDICATIONS  (STANDING):  amLODIPine   Tablet 5 milliGRAM(s) Oral daily  aspirin  chewable 81 milliGRAM(s) Oral daily  atorvastatin 20 milliGRAM(s) Oral at bedtime  budesonide 160 MICROgram(s)/formoterol 4.5 MICROgram(s) Inhaler 2 Puff(s) Inhalation two times a day  dextrose 5%. 1000 milliLiter(s) (50 mL/Hr) IV Continuous <Continuous>  dextrose 50% Injectable 12.5 Gram(s) IV Push once  dextrose 50% Injectable 25 Gram(s) IV Push once  dextrose 50% Injectable 25 Gram(s) IV Push once  diltiazem    milliGRAM(s) Oral daily  finasteride 5 milliGRAM(s) Oral daily  insulin glargine Injectable (LANTUS) 10 Unit(s) SubCutaneous at bedtime  insulin lispro (HumaLOG) corrective regimen sliding scale   SubCutaneous three times a day before meals  insulin lispro Injectable (HumaLOG) 10 Unit(s) SubCutaneous three times a day before meals  melatonin 5 milliGRAM(s) Oral at bedtime  nicotine -  14 mG/24Hr(s) Patch 1 patch Transdermal daily  tamsulosin 0.4 milliGRAM(s) Oral at bedtime  traMADol 50 milliGRAM(s) Oral daily  warfarin 7.5 milliGRAM(s) Oral at bedtime    MEDICATIONS  (PRN):  acetaminophen   Tablet .. 650 milliGRAM(s) Oral every 6 hours PRN Moderate Pain (4 - 6)  ALBUTerol    90 MICROgram(s) HFA Inhaler 2 Puff(s) Inhalation every 6 hours PRN Shortness of Breath and/or Wheezing  dextrose 40% Gel 15 Gram(s) Oral once PRN Blood Glucose LESS THAN 70 milliGRAM(s)/deciliter  glucagon  Injectable 1 milliGRAM(s) IntraMuscular once PRN Glucose LESS THAN 70 milligrams/deciliter      RADIOLOGY & ADDITIONAL TESTS:

## 2019-11-06 NOTE — SWALLOW BEDSIDE ASSESSMENT ADULT - SWALLOW EVAL: RECOMMENDED FEEDING/EATING TECHNIQUES
check mouth frequently for oral residue/pocketing/crush medication (when feasible)/position upright (90 degrees)/maintain upright posture during/after eating for 30 mins/small sips/bites/oral hygiene

## 2019-11-06 NOTE — CONSULT NOTE ADULT - SUBJECTIVE AND OBJECTIVE BOX
84yM was admitted on 11-04 with left sided weakness and fall.     Imaging showed (reviewed):  HEAD CT - Rounded focal hyperdensity measuring up to 8 mm in the region of the left carotid terminus at the level of the left mamillary body/anterior fornix. Additional rounded hyperdensity in the left cerebellum measuring up to 5 mm. Considerations include microhemorrhages, cavernomas, hemorrhagic metastases, and vascular lesion given the proximity of the 8 mm lesion on the left adjacent to the carotid terminus. Contrast-enhanced MRI is recommended for further evaluation. Extensive chronic small vessel ischemic changes in the frontoparietal white matter.    MRI BRAIN - 5 mm left cerebellar hemisphere cavernoma. 7 mm susceptibility artifact in the left aspect of the suprasellar cistern/hypothalamus without enhancement which may represent a partially calcified lesion. Differential diagnosis includes left hypothalamic lesion, cavernoma, thrombosed aneurysm, and metastasis.    Patient seen earlier this AM. Has poor mental status, Attempting to sit up to urinate, but cannot. Has poor motor coordination/planning. Required 2 RN to stand to urinate. Unable to sit without assistance and falls back. Difficult to make needs known and slowed in processing. Noted right sided weakness more than left. Noted to have congestion upon coughing.     REVIEW OF SYSTEMS  Constitutional - No fever, No weight loss, +fatigue  HEENT - No eye pain, No visual disturbances, No difficulty hearing, No tinnitus, No vertigo, No neck pain  Respiratory - +cough, No wheezing, +shortness of breath  Cardiovascular - No chest pain, No palpitations  Gastrointestinal - No abdominal pain, No nausea, No vomiting, No diarrhea, No constipation  Genitourinary - No dysuria, No frequency, No hematuria, No incontinence  Neurological - No headaches, +memory loss, +loss of strength, No numbness, No tremors  Skin - No itching, No rashes, No lesions   Endocrine - No temperature intolerance  Musculoskeletal - No joint pain, No joint swelling, No muscle pain  Psychiatric - No depression, No anxiety    VITALS  T(C): 36.8 (11-06-19 @ 05:05), Max: 37.3 (11-05-19 @ 15:45)  HR: 105 (11-06-19 @ 08:44) (89 - 105)  BP: 148/77 (11-06-19 @ 05:05) (141/76 - 158/72)  RR: 18 (11-06-19 @ 05:05) (16 - 18)  SpO2: 97% (11-06-19 @ 08:44) (93% - 97%)  Wt(kg): --    PAST MEDICAL & SURGICAL HISTORY  COPD (chronic obstructive pulmonary disease) with emphysema  Rheumatoid arthritis  Overactive bladder  Hypertension  BPH (benign prostatic hyperplasia)  Afib  DM (diabetes mellitus)  No significant past surgical history      SOCIAL HISTORY  Smoking - Denied  EtOH - Denied   Drugs - Denied    FUNCTIONAL HISTORY  Lives with spouse, 2 KAYLENE  Independent    CURRENT FUNCTIONAL STATUS  11/4  Bed Mobility: Rolling/Turning:     · Level of [place]	minimum assist (75% patients effort)	    Bed Mobility: Sit to Supine:     · Level of [place]	minimum assist (75% patients effort)	    Bed Mobility: Supine to Sit:     · Level of [place]	minimum assist (75% patients effort)	    Transfer: Sit to Stand:     · Level of [place]	minimum assist (75% patients effort)	  · Physical Assist/Nonphysical Assist	2 person assist	    Transfer: Stand to Sit:     · Level of [place]	minimum assist (75% patients effort)	  · Physical Assist/Nonphysical Assist	2 person assist	    Gait Skills:     · Level of [place]	3 side steps at bedside RW minAx2	    Gait Analysis:     · Gait Pattern Used	pt requiring max verbal and tactile cues for sequencing, unsteadiness throughout, decreased em step length, decreased upward gaze	    Stair Negotiation:     · Level of [place]	unable to perform; safety	        FAMILY HISTORY   FH: diabetes mellitus  Family hx of hypertension      RECENT LABS/IMAGING  CBC Full  -  ( 06 Nov 2019 08:56 )  WBC Count : 8.39 K/uL  RBC Count : 3.96 M/uL  Hemoglobin : 11.7 g/dL  Hematocrit : 36.1 %  Platelet Count - Automated : 226 K/uL  Mean Cell Volume : 91.2 fl  Mean Cell Hemoglobin : 29.5 pg  Mean Cell Hemoglobin Concentration : 32.4 gm/dL  Auto Neutrophil # : x  Auto Lymphocyte # : x  Auto Monocyte # : x  Auto Eosinophil # : x  Auto Basophil # : x  Auto Neutrophil % : x  Auto Lymphocyte % : x  Auto Monocyte % : x  Auto Eosinophil % : x  Auto Basophil % : x    11-06    140  |  102  |  19.0  ----------------------------<  230<H>  4.1   |  25.0  |  1.18    Ca    9.8      06 Nov 2019 08:56    TPro  8.4  /  Alb  3.9  /  TBili  0.3<L>  /  DBili  x   /  AST  17  /  ALT  11  /  AlkPhos  56  11-05        ALLERGIES  No Known Allergies      MEDICATIONS   acetaminophen   Tablet .. 650 milliGRAM(s) Oral every 6 hours PRN  ALBUTerol    90 MICROgram(s) HFA Inhaler 2 Puff(s) Inhalation every 6 hours PRN  amLODIPine   Tablet 5 milliGRAM(s) Oral daily  aspirin  chewable 81 milliGRAM(s) Oral daily  atorvastatin 20 milliGRAM(s) Oral at bedtime  budesonide 160 MICROgram(s)/formoterol 4.5 MICROgram(s) Inhaler 2 Puff(s) Inhalation two times a day  dextrose 40% Gel 15 Gram(s) Oral once PRN  dextrose 5%. 1000 milliLiter(s) IV Continuous <Continuous>  dextrose 50% Injectable 12.5 Gram(s) IV Push once  dextrose 50% Injectable 25 Gram(s) IV Push once  dextrose 50% Injectable 25 Gram(s) IV Push once  diltiazem    milliGRAM(s) Oral daily  finasteride 5 milliGRAM(s) Oral daily  glucagon  Injectable 1 milliGRAM(s) IntraMuscular once PRN  insulin glargine Injectable (LANTUS) 10 Unit(s) SubCutaneous at bedtime  insulin lispro (HumaLOG) corrective regimen sliding scale   SubCutaneous three times a day before meals  insulin lispro Injectable (HumaLOG) 10 Unit(s) SubCutaneous three times a day before meals  nicotine -  14 mG/24Hr(s) Patch 1 patch Transdermal daily  tamsulosin 0.4 milliGRAM(s) Oral at bedtime  traMADol 50 milliGRAM(s) Oral daily  warfarin 5 milliGRAM(s) Oral at bedtime      ----------------------------------------------------------------------------------------  PHYSICAL EXAM  Constitutional - NAD, Comfortable  HEENT - NCAT, EOMI  Neck - Supple, No limited ROM  Chest - Increased work of breathing, + cough - congested   Cardiovascular - S1S2   Abdomen - Soft   Extremities - No C/C/E, No calf tenderness   Neurologic Exam -                    Cognitive - Awake, Alert, AAO to self      Communication - +Dysarthria, Slowed processing, Poor planning     Cranial Nerves - CN 2-12 intact     Motor - Left UE preference with functional activities, unable to fully assess - poor command following related apraxia     Sensory - Intact to LT  Psychiatric - Fatigued	  ----------------------------------------------------------------------------------------  ASSESSMENT/PLAN  84yMale with functional deficits after developing left sided weakness and confusion  TIA? - ASA, Lipitor  AFIB - Coumadin, Cardizem  HTN - Norvasc  DM2 - Humalog, Lantus, ISS  Pain - Tylenol, Tramadol  DVT PPX - SCDs  Rehab - Unclear the cause of patient's functional decline (not explained by neurological process). Patient without evidence of acute findings as per MRI. Will continue to follow. Family wants patient home, but considering functional performance this AM, may be unsafe and may need PATTI. Does not meet criteria for AR.     Continue bedside therapy as well as OOB throughout the day with mobilization by staff to maintain cardiopulmonary function and prevention of secondary complications related to debility.
[facility]                                        [facility] [place]                                  [facility]                                      [address]                                           [place], [place], [address]                                                [phone]        CC: TIA and headache     HISTORY:  The patient is a 84y Male who reported some left sided headache and weakness which began yesterday. The weakness has resolved. It is not clear how long it lasted.   He reports some mild residual headache but this has nearly resolved as well.   He has atrial fibrillation and is on anticoagulation with Coumadin.       PAST MEDICAL & SURGICAL HISTORY:  COPD (chronic obstructive pulmonary disease) with emphysema  Rheumatoid arthritis  Overactive bladder  Hypertension  BPH (benign prostatic hyperplasia)  Afib  DM (diabetes mellitus)  No significant past surgical history    MEDICATIONS  (STANDING):  atorvastatin 20 milliGRAM(s) Oral at bedtime  budesonide 160 MICROgram(s)/formoterol 4.5 MICROgram(s) Inhaler 2 Puff(s) Inhalation two times a day  diltiazem    milliGRAM(s) Oral daily  finasteride 5 milliGRAM(s) Oral daily  insulin lispro (HumaLOG) corrective regimen sliding scale   SubCutaneous three times a day before meals  nicotine -  14 mG/24Hr(s) Patch 1 patch Transdermal daily  tamsulosin 0.4 milliGRAM(s) Oral at bedtime  traMADol 50 milliGRAM(s) Oral daily    MEDICATIONS  (PRN):  acetaminophen   Tablet .. 650 milliGRAM(s) Oral every 6 hours PRN Moderate Pain (4 - 6)  ALBUTerol    90 MICROgram(s) HFA Inhaler 2 Puff(s) Inhalation every 6 hours PRN Shortness of Breath and/or Wheezing  ALBUTerol   0.5% 2.5 milliGRAM(s) Nebulizer every 6 hours PRN Shortness of Breath and/or Wheezing  dextrose 40% Gel 15 Gram(s) Oral once PRN Blood Glucose LESS THAN 70 milliGRAM(s)/deciliter  glucagon  Injectable 1 milliGRAM(s) IntraMuscular once PRN Glucose LESS THAN 70 milligrams/deciliter      Allergies  No Known Allergies    SOCIAL HISTORY:  Smoker. 1 pack/day.    FAMILY HISTORY:  Father . History of headache but no stroke history.  Mother . No known history of stroke.   FH: diabetes mellitus  Family hx of hypertension    ROS:  Constitutional: The patient denies fevers or weight changes.  Neuro: As per HPI.  Eyes: Denies blurry vision.  Ears/nose/throat: Denies Tinnitus.   Cardiac: Denies chest pain. Denies palpitations.  Respiratory: Denies shortness of breath.  GI: Denies abdominal pain, nausea, or vomiting.  : Denies change in urinary pattern.  Integumentary: Denies rash.  Psych: Denies recent mood changes.  Heme: denies easy bleeding/bruising.    Exam:  Vital Signs Last 24 Hrs  T(C): 36.6 (2019 09:22), Max: 36.8 (2019 19:31)  T(F): 97.9 (2019 09:22), Max: 98.3 (2019 19:31)  HR: 94 (2019 09:22) (90 - 104)  BP: 166/78 (2019 09:22) (163/87 - 181/85)  RR: 20 (2019 09:22) (18 - 20)  SpO2: 94% (:22) (94% - 97%)  General: NAD.   Carotid bruits absent.     Mental status: The patient is awake, alert, and fully oriented. There is no aphasia.     Cranial nerves: There is no papilledema. Pupils react symmetrically to light. There is no visual field deficit to confrontation. Extraocular motion is full with no nystagmus. There is no ptosis. Facial sensation is intact. Facial musculature is symmetric. Hearing is somewhat decreased bilaterally. Palate elevates symmetrically. Tongue is midline.    Motor: There is normal bulk and tone.  Strength is 5/5 in the right arm and leg.   Strength is 5/5 in the left arm and leg.    Sensation: Intact to light touch and pin. There is no extinction to double simultaneous stimulation.    Reflexes: 2+ throughout and plantar responses are flexor.    Cerebellar: There is no dysmetria on finger to nose testing.    [facility] SS:   Date: 19  Time: 12:00 (noon)  1a) Level of consciousness (0-3): 0  1b) Questions (0-2): 0  1c) Commands (0-2): 0  2  ) Gaze (0-2): 0  3  ) Visual field (0-3): 0  4  ) Facial palsy (0-3): 0  Motor  5a) Left arm (0-4): 0  5b) Right arm (0-4): 0  6a) Left leg (0-4): 0  6b) Right leg (0-4): 0  7  ) Ataxia (0-2): 0  Sensory  8  ) Sensory (0-2): 0  Speech  9  ) Language (0-3): 0  10) Dysarthria (0-2): 0  Extinction  11) Extinction/inattention (0-2): 0    Total score: 0    LABS:                         11.2   7.12  )-----------( 217      ( 2019 06:14 )             34.5           144  |  106  |  13.0  ----------------------------<  165<H>  3.9   |  24.0  |  0.94    Ca    10.0      2019 06:14        PT/INR - ( 2019 09:30 )   PT: 32.6 sec;   INR: 2.75 ratio    PTT - ( 2019 21:19 )  PTT:40.1 sec    RADIOLOGY   CT head shows a rounded hyper-density at the left carotid terminus.   MRI suggested this to be a calcification. There was also a 5 mm cavernoma in the left cerebellar hemisphere.   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  MR Angio of the brain was unremarkable. There was no sign of thrombosed aneurysm (which was also in the differential diagnosis of the calcification).

## 2019-11-06 NOTE — SWALLOW BEDSIDE ASSESSMENT ADULT - COMMENTS
As per MD note:   "83y/o M with PMHx of pAfib on coumadin, DM type 2, HTN, OAB, BPH, RA presents to ED c/o headache and left sided weakness admitted for TIA/CVA and ct showing area of hypodensities unclear if mets or micro hemorrhage recommend MRI"

## 2019-11-07 NOTE — PROGRESS NOTE ADULT - SUBJECTIVE AND OBJECTIVE BOX
CC: encephalopathy     INTERVAL HPI/OVERNIGHT EVENTS: seen and examined , no events reported . still episodic confusion .     REVIEW OF SYSTEMS:    CONSTITUTIONAL: feels ok   RESPIRATORY: No cough, wheezing, hemoptysis; No shortness of breath  CARDIOVASCULAR: No chest pain, palpitations  GASTROINTESTINAL: No abdominal or epigastric pain. No nausea, vomiting  NEUROLOGICAL: No headaches, no blurry vision       Vital Signs Last 24 Hrs  T(C): 36.8 (07 Nov 2019 15:50), Max: 36.8 (07 Nov 2019 05:10)  T(F): 98.2 (07 Nov 2019 15:50), Max: 98.2 (07 Nov 2019 05:10)  HR: 95 (07 Nov 2019 15:50) (80 - 98)  BP: 121/73 (07 Nov 2019 15:50) (121/73 - 144/81)  BP(mean): --  RR: 19 (07 Nov 2019 15:50) (18 - 19)  SpO2: 90% (07 Nov 2019 15:50) (90% - 100%)    PHYSICAL EXAM:    GENERAL: NAD, resting comfortable in bed    CHEST/LUNG: fair air entry BL.   HEART: S1S2+, Regular rate and rhythm  ABDOMEN: Soft, Nontender, Nondistended; Bowel sounds present  EXTREMITIES:  no pitting edema , pulses palpated BL.      LABS:                        11.7   8.39  )-----------( 226      ( 06 Nov 2019 08:56 )             36.1     11-07    142  |  105  |  23.0<H>  ----------------------------<  189<H>  4.1   |  25.0  |  1.08    Ca    9.7      07 Nov 2019 08:01      PT/INR - ( 07 Nov 2019 08:01 )   PT: 18.9 sec;   INR: 1.62 ratio                 MEDICATIONS  (STANDING):  amLODIPine   Tablet 5 milliGRAM(s) Oral daily  aspirin  chewable 81 milliGRAM(s) Oral daily  atorvastatin 20 milliGRAM(s) Oral at bedtime  budesonide 160 MICROgram(s)/formoterol 4.5 MICROgram(s) Inhaler 2 Puff(s) Inhalation two times a day  dextrose 5%. 1000 milliLiter(s) (50 mL/Hr) IV Continuous <Continuous>  dextrose 50% Injectable 12.5 Gram(s) IV Push once  dextrose 50% Injectable 25 Gram(s) IV Push once  dextrose 50% Injectable 25 Gram(s) IV Push once  diltiazem    milliGRAM(s) Oral daily  finasteride 5 milliGRAM(s) Oral daily  insulin glargine Injectable (LANTUS) 10 Unit(s) SubCutaneous at bedtime  insulin lispro (HumaLOG) corrective regimen sliding scale   SubCutaneous three times a day before meals  insulin lispro Injectable (HumaLOG) 10 Unit(s) SubCutaneous three times a day before meals  melatonin 5 milliGRAM(s) Oral at bedtime  nicotine -  14 mG/24Hr(s) Patch 1 patch Transdermal daily  tamsulosin 0.4 milliGRAM(s) Oral at bedtime  traMADol 50 milliGRAM(s) Oral daily  warfarin 10 milliGRAM(s) Oral daily    MEDICATIONS  (PRN):  acetaminophen   Tablet .. 650 milliGRAM(s) Oral every 6 hours PRN Moderate Pain (4 - 6)  ALBUTerol    90 MICROgram(s) HFA Inhaler 2 Puff(s) Inhalation every 6 hours PRN Shortness of Breath and/or Wheezing  dextrose 40% Gel 15 Gram(s) Oral once PRN Blood Glucose LESS THAN 70 milliGRAM(s)/deciliter  glucagon  Injectable 1 milliGRAM(s) IntraMuscular once PRN Glucose LESS THAN 70 milligrams/deciliter      RADIOLOGY & ADDITIONAL TESTS:

## 2019-11-07 NOTE — PROGRESS NOTE ADULT - ASSESSMENT
1. Acute encephalopathy   unclear etiology .  concern if this was TIA .  headache resolved, ESR not impressive .  afebrile , no leukocytosis , discussed with neuro no need for LP.  encephalopathy is improved , will continue to monitor.    2. Possible TIA   seen by neuro .  already maintained on AC .    3. Hx of afib   c/w AC.     4. BPH   c/w home regimen .    5. HTN   on home regimen .    6. DM  c/w sliding scale.     7. Deconditioning   seen by PT , rehab recommended .  to be seen by PMR .    8. DVT prophylaxis  on coumadin , INR therapeutic .     details discussed with son and wife , also discussed with case management . all concerns answered .  details discussed with neuro no indication for LP , will monitor neuro status .
1. Acute encephalopathy   unclear etiology .  concern if this was TIA versus underlying dementia with delirium  .  headache resolved, ESR not impressive .  afebrile , no leukocytosis , discussed with neuro no need for LP.  encephalopathy is episodic , blood gas is within acceptable limits , ammonia is normal , MRI showed chronic calcified lesions , nothing acute to explain symptoms .   on melatonin at bedtime , seroquel if needed .  no behavioural issues .   as per wife patient has been having memory issues for months , will need work up outpatient .  does not seem to be new , probably progression of dementia .     2. Possible TIA   seen by neuro .  already maintained on AC .    3. Hx of afib   c/w AC.   increase coumadin dose.   follow INR in am .    4. BPH   c/w home regimen .    5. HTN   on home regimen .    6. DM  c/w sliding scale.     7. Deconditioning   awaiting PATTI placement     8. DVT prophylaxis  on coumadin , INR sub therapeutic , will increase the dose.      plan is to discharge in am to [facility] , will need close follow up with neurology .
83y/o M with PMHx of pAfib on coumadin, DM type 2, HTN, OAB, BPH, RA presents to ED c/o headache and left sided weakness admitted for TIA/CVA and ct showing area of hypodensities unclear if mets or micro hemorrhage recommend MRI       TIA (transient ischemic attack).    -no acute focal deficit on exam  -Ct head showed Rounded focal hyperdensity measuring up to 8 mm in the region of the left carotid terminus at the level of the left mamillary body/anterior fornix and additional rounded hyperdensity in the left cerebellum measuring up to 5   mm unclear if mets or microhemorrhage recommended MRI    -MRI/MRA: 5 mm left cerebellar hemisphere cavernoma. 7 mm   susceptibility artifact in the left aspect of the suprasellar   cistern/hypothalamus without enhancement which may represent a partially   calcified lesion. Differential diagnosis includes left hypothalamic   lesion, cavernoma, thrombosed aneurysm, and metastasis  -started on atorvastatin  -f/u Lipid panel   -Neuro input appreciated  -f/u ESR    Paroxysmal atrial fibrillation.    -EKG NSR  -cont with Cardizem 240mg CD with holding parameter for rate control   -On coumadin, follow INR    COPD (chronic obstructive pulmonary disease) with emphysema: stable  -no acute exacerbation  -cont with Symbicort, neb and inhalers prn.     Essential hypertension.   -will restart BP meds    Type 2 diabetes mellitus with other specified complication, without long-term current use of insulin.    -hold po metformin, glipizide  -cont with ISS, monitor BG.     BPH (benign prostatic hyperplasia).   - cont with Flomax, Finasteride.    Rheumatoid arthritis.    - with left knee effusion s/p aspiration and cortisone injection outpt   -cont with tramadol for pain control.     Nicotine abuse.  -smoking cessation counseling provided   -offered nicotine patch, pt accepted.     PT eval: acute rehab  Need for prophylactic measure.    -cont coumadin  -f/u INR
84y Male with headache and possibly some transient left sided weakness.     Headache.   Now much improved.   ESR unremarkable for age.     TIA.   Possible TIA given presentation.  Has atrial fibrillation.   He is therapeutic on Coumadin.  LDL: 121  Goal: < 70  Continue statin.   Mobilize with physical therapy.     Discharge planning.   Home when medically stable.     No further specific neurologic recommendations.
1. Acute encephalopathy   unclear etiology .  concern if this was TIA versus underlying dementia with delirium  .  headache resolved, ESR not impressive .  afebrile , no leukocytosis , discussed with neuro no need for LP.  encephalopathy is episodic , blood gas is within acceptable limits , ammonia is pending , MRI showed chronic calcified lesions , nothing acute to explain symptoms .   will start melatonin at bedtime , seroquel if needed .  no behavioural issues .   as per wife patient has been having memory issues for months , will need work up outpatient .    2. Possible TIA   seen by neuro .  already maintained on AC .    3. Hx of afib   c/w AC.   increase coumadin dose.     4. BPH   c/w home regimen .    5. HTN   on home regimen .    6. DM  c/w sliding scale.     7. Deconditioning   seen by PT , will need PATTI placement .  discussed with wife she is in agreement .    8. DVT prophylaxis  on coumadin , INR sub therapeutic , will increase the dose.      details discussed with wife , and case management .

## 2019-11-07 NOTE — PROGRESS NOTE ADULT - SUBJECTIVE AND OBJECTIVE BOX
Patient in bed, appears more alert and speech is improved.   Continues to have slowed processing and motor planning.   Reports no pain.   Patient on Puree/nectar given cough and cognitive status.     REVIEW OF SYSTEMS  Constitutional - No fever,  +fatigue  Neurological - No headaches, +memory loss, +loss of strength    FUNCTIONAL PROGRESS  11/4  Bed Mobility: Rolling/Turning:     · Level of [place]	minimum assist (75% patients effort)	    Bed Mobility: Sit to Supine:     · Level of [place]	minimum assist (75% patients effort)	    Bed Mobility: Supine to Sit:     · Level of [place]	minimum assist (75% patients effort)	    Transfer: Sit to Stand:     · Level of [place]	minimum assist (75% patients effort)	  · Physical Assist/Nonphysical Assist	2 person assist	    Transfer: Stand to Sit:     · Level of [place]	minimum assist (75% patients effort)	  · Physical Assist/Nonphysical Assist	2 person assist	    Gait Skills:     · Level of [place]	3 side steps at bedside RW minAx2	    Gait Analysis:     · Gait Pattern Used	pt requiring max verbal and tactile cues for sequencing, unsteadiness throughout, decreased em step length, decreased upward gaze	    Stair Negotiation:     · Level of [place]	unable to perform; safety	        VITALS  T(C): 36.2 (11-07-19 @ 08:26), Max: 36.8 (11-07-19 @ 05:10)  HR: 80 (11-07-19 @ 08:26) (80 - 107)  BP: 122/69 (11-07-19 @ 08:26) (122/69 - 145/77)  RR: 19 (11-07-19 @ 08:26) (17 - 19)  SpO2: 100% (11-07-19 @ 08:26) (92% - 100%)  Wt(kg): --    MEDICATIONS   acetaminophen   Tablet .. 650 milliGRAM(s) every 6 hours PRN  ALBUTerol    90 MICROgram(s) HFA Inhaler 2 Puff(s) every 6 hours PRN  amLODIPine   Tablet 5 milliGRAM(s) daily  aspirin  chewable 81 milliGRAM(s) daily  atorvastatin 20 milliGRAM(s) at bedtime  budesonide 160 MICROgram(s)/formoterol 4.5 MICROgram(s) Inhaler 2 Puff(s) two times a day  dextrose 40% Gel 15 Gram(s) once PRN  dextrose 5%. 1000 milliLiter(s) <Continuous>  dextrose 50% Injectable 12.5 Gram(s) once  dextrose 50% Injectable 25 Gram(s) once  dextrose 50% Injectable 25 Gram(s) once  diltiazem    milliGRAM(s) daily  finasteride 5 milliGRAM(s) daily  glucagon  Injectable 1 milliGRAM(s) once PRN  insulin glargine Injectable (LANTUS) 10 Unit(s) at bedtime  insulin lispro (HumaLOG) corrective regimen sliding scale   three times a day before meals  insulin lispro Injectable (HumaLOG) 10 Unit(s) three times a day before meals  melatonin 5 milliGRAM(s) at bedtime  nicotine -  14 mG/24Hr(s) Patch 1 patch daily  PPD  5 Tuberculin Unit(s) Injectable 5 Unit(s) once  tamsulosin 0.4 milliGRAM(s) at bedtime  traMADol 50 milliGRAM(s) daily  warfarin 7.5 milliGRAM(s) at bedtime      RECENT LABS - Reviewed                        11.7   8.39  )-----------( 226      ( 06 Nov 2019 08:56 )             36.1     11-07    142  |  105  |  23.0<H>  ----------------------------<  189<H>  4.1   |  25.0  |  1.08    Ca    9.7      07 Nov 2019 08:01      PT/INR - ( 07 Nov 2019 08:01 )   PT: 18.9 sec;   INR: 1.62 ratio                   ----------------------------------------------------------------------------------------  PHYSICAL EXAM  Constitutional - NAD, Comfortable  Extremities - No C/C/E, No calf tenderness   Neurologic Exam -                    Cognitive - Awake, Alert, AAO to self, hospital NOT name or situation     Communication - +Dysarthria, Slowed processing, Poor planning     Motor - No focal deficits - 3-4/5     Sensory - Intact to LT  Psychiatric - Fatigued  ----------------------------------------------------------------------------------------  ASSESSMENT/PLAN  84yMale with functional deficits after developing left sided weakness and confusion  CVA PPX - ASA, Lipitor  AFIB - Coumadin, Cardizem  HTN - Norvasc  DM2 - Humalog, Lantus, ISS  Pain - Tylenol, Tramadol  DVT PPX - SCDs  Rehab - Patient's functional decline not explained by acute neurological process and likely progression of pre-existing underlying dementia with acute exacerbation. Patient without evidence of acute findings as per MRI. Now wife agreeable for  PATTI.     Will sign off at this time. Thank you for allowing me to be part of your patient's care. Please reconsult PMR for additional rehab recommendations or dispo needs if functional status changes.     Continue bedside therapy as well as OOB throughout the day with mobilization by staff to maintain cardiopulmonary function and prevention of secondary complications related to debility.

## 2019-11-08 NOTE — DISCHARGE NOTE NURSING/CASE MANAGEMENT/SOCIAL WORK - NSDCPEPTSTRK_GEN_ALL_CORE
Risk factors for stroke/Stroke warning signs and symptoms/Need for follow up after discharge/Prescribed medications/Stroke education booklet/Signs and symptoms of stroke/Stroke support groups for patients, families, and friends/Call 911 for stroke

## 2019-11-08 NOTE — DISCHARGE NOTE NURSING/CASE MANAGEMENT/SOCIAL WORK - NSDCPEWEB_GEN_ALL_CORE
[facility] for Tobacco Control website --- [email or web address] --- [email or web address]

## 2019-11-08 NOTE — DISCHARGE NOTE PROVIDER - HOSPITAL COURSE
Hospital course :                                                Physical Exam :    Vitals :    GEN :     HEART:    LUNGS:    ABDOMEN:    EXT:                35 minutes were spent on discharge co ordination . Hospital course :    83y/o M with PMHx of pAfib on coumadin, DM type 2, HTN, OAB, BPH, RA presents to ED c/o headache and left sided weakness. had CT showed concerning findings and lesions underwent MRI showed cavernomas , calcified lesions , no reasons for findings ,weakness and headache improved , seen by neurology this was felt to be TIA in origin , LDL was 121 was started on lipitor. he did have episodic confusion during hospital stay wife mentioned that he has been having memory issues , ammonia and blood gas was normal , this was felt to be delirium with dementia , he had no agitation , was started on melatonin .    he will follow up with neurology out patient .     INR was subtherapeutic in hospital , increased dose of coumadin , INR gradually improving , will resume 5mg daily . no need for aspirin , no need for aspirin as there is no CAD .    details were discussed with wife on multiple occasions , patient qualified for PATTI placement , will be discharged today .    he also had some oxygen requirements , probably due to deconditioning , xray done , showed chronic interstitial changes , will need follow up with PCP . now on room air .         Physical Exam :    Vitals : reviewed , stable.     GEN : age appropriate , comfortable.     HEART: S1,S2 RRR     LUNGS: CTAB     ABDOMEN: soft . NT, ND , positive bowel sounds     EXT: no pitting edema                 35 minutes were spent on discharge co ordination .

## 2019-11-08 NOTE — DISCHARGE NOTE NURSING/CASE MANAGEMENT/SOCIAL WORK - NSDCPEPTCOWAR_GEN_ALL_CORE
Warfarin/Coumadin - Dietary Advice/Warfarin/Coumadin - Follow up monitoring/Warfarin/Coumadin - Potential for adverse drug reactions and interactions

## 2019-11-08 NOTE — DISCHARGE NOTE PROVIDER - CARE PROVIDER_API CALL
Alok Adair)  Neurology; Vascular Neurology  [address], [address]  [place] [address]  Phone: [phone]  Fax: [phone]  Follow Up Time: 2 weeks    PCP,   Phone: (   )    -  Fax: (   )    -  Follow Up Time: 1-3 days

## 2019-11-08 NOTE — DISCHARGE NOTE PROVIDER - NSDCFUADDINST_GEN_ALL_CORE_FT
please follow up with PCP for abnormal chest xray , also please follow up with neurology please follow up with PCP for abnormal chest xray , also please follow up with neurology.  please follow up for INR check

## 2019-11-08 NOTE — DISCHARGE NOTE PROVIDER - CARE PROVIDERS DIRECT ADDRESSES
[email or web address]

## 2019-11-08 NOTE — DISCHARGE NOTE PROVIDER - PROVIDER TOKENS
PROVIDER:[TOKEN:[[id number]],FOLLOWUP:[2 weeks]],FREE:[LAST:[PCP],PHONE:[(   )    -],FAX:[(   )    -],FOLLOWUP:[1-3 days]]

## 2019-11-08 NOTE — DISCHARGE NOTE NURSING/CASE MANAGEMENT/SOCIAL WORK - NSDCPEEMAIL_GEN_ALL_CORE
[facility] for Tobacco Control email [email or web address]

## 2019-11-08 NOTE — DISCHARGE NOTE PROVIDER - NSDCCPCAREPLAN_GEN_ALL_CORE_FT
PRINCIPAL DISCHARGE DIAGNOSIS  Diagnosis: Encephalopathy acute  Assessment and Plan of Treatment:       SECONDARY DISCHARGE DIAGNOSES  Diagnosis: Dementia  Assessment and Plan of Treatment:     Diagnosis: Confusion  Assessment and Plan of Treatment:

## 2019-11-19 PROBLEM — M06.9 RHEUMATOID ARTHRITIS, UNSPECIFIED: Chronic | Status: ACTIVE | Noted: 2019-01-01

## 2019-11-19 PROBLEM — I10 ESSENTIAL (PRIMARY) HYPERTENSION: Chronic | Status: ACTIVE | Noted: 2019-01-01

## 2019-11-19 PROBLEM — J43.9 EMPHYSEMA, UNSPECIFIED: Chronic | Status: ACTIVE | Noted: 2019-01-01

## 2019-11-19 PROBLEM — E11.9 TYPE 2 DIABETES MELLITUS WITHOUT COMPLICATIONS: Chronic | Status: ACTIVE | Noted: 2019-01-01

## 2019-11-19 PROBLEM — M25.461 EFFUSION OF RIGHT KNEE: Status: ACTIVE | Noted: 2019-03-18

## 2019-11-19 PROBLEM — N40.0 BENIGN PROSTATIC HYPERPLASIA WITHOUT LOWER URINARY TRACT SYMPTOMS: Chronic | Status: ACTIVE | Noted: 2019-01-01

## 2019-11-19 PROBLEM — M17.12 PRIMARY LOCALIZED OSTEOARTHRITIS OF LEFT KNEE: Status: ACTIVE | Noted: 2019-01-01

## 2019-11-19 PROBLEM — I48.91 UNSPECIFIED ATRIAL FIBRILLATION: Chronic | Status: ACTIVE | Noted: 2019-01-01

## 2019-11-19 PROBLEM — N32.81 OVERACTIVE BLADDER: Chronic | Status: ACTIVE | Noted: 2019-01-01

## 2019-11-19 NOTE — HISTORY OF PRESENT ILLNESS
[Pain Location] : pain [Worsening] : worsening [___ yrs] : [unfilled] year(s) ago [6] : a current pain level of 6/10 [Walking] : worsened by walking [Rest] : relieved by rest [de-identified] : Patient is a 84 year old male who presents c/o long-standing left knee pain. pt presented in a wheel chair.  He is currently in [facility] after a recent fall. \par he was c/o left knee pain and swelling when he was having  PT. he ambulates with walker few steps with PT. he needs 2 people assistant for transferring.  pt is accompanied by his wife. \par \par   He states that pain is diffuse throughout the left knee. He also c/o swelling to the left knee. He reports that he has attempted hyaluronic acid injections ~7 years ago. He does not use any medication for pain relief. pt is on coumadin.\par Pain is worse with activity. Pain is better with rest. He wishes to discuss his treatment options, but is not interested in surgery due to his age. \par xray were done recently in the rehab. \par \par He states the symptoms are worsening. The patient mentions the symptoms started 7+ year(s) ago. Pain levels include a current pain level of 6/10. \par His symptoms occur while walking. He states the symptoms seem to be constant. \par \par Modifying factors - worsened by bending and worsened by walking. Relieving factors include relieved by recumbency and relieved by rest.

## 2019-11-19 NOTE — DISCUSSION/SUMMARY
[de-identified] : Surgical risks reviewed. 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 Based on imaging he is a candidate for left TKA, and I discussed with him that total knee replacement would be the best option for long-term pain relief. He and his wife defers pursuing surgery at this time as he is worried about his age. Today he elected to have his left knee aspirated and to receive a left knee cortisone injection. He tolerated  procedures well. I sent synovial fluid analysis for gopi, culture and cell count. we will f.u with the results. he is at higher risk for surgical complication due to weakness and other medical comorbidity.\par pt require cardiopulmonary clearance prior to sx. \par \par A knee replacement means resurfacing of all 3 surfaces of the patella, the femur, and tibia with metal and plastic parts. The prosthetic parts are usually cemented into position and well outpatient range of motion from full extension to about 120° of flexion. The postoperative motion, however, is determined by multiple factors, the most important of which is preoperative motion. In general, the better motion preoperatively, the better the motion postoperatively. The operation, pending medical clearance, gently requires hospitalization of 3-4 days for one knee, 5-7 days for bilateral knee replacements. In general, we prefer to perform the procedure under spinal anesthesia with femoral nerve block and occasional single shot sciatic nerve block. We may ask a patient to give 2 units of blood for bilateral total knee arthroplasties, for one knee we institute a normogram which may include administration of preoperative Procrit. The operative procedure takes probably 1-2 hours. The operation requires a straight incision anywhere from 5-7 inches down from the knee. Postoperatively, the patient is positioned in a continuous passive motion machine within the first 24 hours and is walking the day after surgery. The first couple days are very painful and the pain medication will alleviate, but not eliminate the pain. The patient must really push hard to get range of motion. Our goal for having a person go home as that the range of motion is approximately 0-90° of flexion and that they can walk with a walker or cane. A walking aid is to be dispensed once the patient is secure enough. In general there, there is no cast or brace required with routine knee replacement. In the long term, we do not encourage our patients to run for the sake of running, although pending their preoperative status, we often allow patient to play doubles tennis or comparative activities. We also allowed them to do gentle intermediate downhill skiing if they are truly an expert skier. Biking is encouraged as well swimming. The followup periods are usually 3 weeks, 6 weeks, 3 months, and yearly intervals. Potential complications with total knee replacement included anesthetic complications and death, infection around 1%, nerve damage, by which means peroneal nerve palsy, footdrop or flapping foot with ambulation. This is particularly more apt to occur in the patient with a valgus or knock-knee deformity. The incidence can be quite high in this particular patient population. There will be areas of skin numbness, but this is not an untoward effect nor do we consider it a complication. Other potential complications include dislocation of the patella component, usually less than 2%; loosening of the tibial or femoral component is much more infrequent. Most often this occurs with infection or long-term use. Patient of extreme risk including markedly overweight patient's may be more prone to prosthetic wear. Major blood vessel damage is also extremely rare. Directly because of the anatomic proximity of the popliteal artery this could be lacerated with subsequent repair required. Be it unlikely, disruption of the popliteal artery could theoretically result in amputation. Similarly, infection could theoretically result in amputation if one were to grow out of an organism that cannot be controlled with antibiotics. General medical complications include phlebitis, for which we would prophylactically anticoagulate patients, but could still occur, and fatal pulmonary embolus which has been reported. Cardiovascular problems, such as heart attack or ischemia are always a concern with such hemodynamic changes in the blood vascular system. Other General complications are rare, but anything medicine could theoretically happen. I think the patient understands the risk benefit ratio of total knee replacement and will think about whether there like to pursue with an operation or nonoperative treatment program. I reviewed the plan of care as well as a model of a total knee implant equivalent to the one that will be used for their total knee joint replacement. The patient agreed to the plan of care as well as the use of implants for their knee total joint replacement. \par  \par

## 2019-11-19 NOTE — PHYSICAL EXAM
[Antalgic] : antalgic [Walker] : ambulates with walker [Wheelchair] : uses a wheelchair [de-identified] : GENERAL APPEARANCE: Well nourished and hydrated, pleasant, alert, and oriented x 3. Appears their stated age. \par HEENT: Normocephalic, extraocular eye motion intact. Nasal septum midline. Oral cavity clear. External auditory canal clear. \par RESPIRATORY: Breath sounds clear and audible in all lobes. No wheezing, No accessory muscle use.\par CARDIOVASCULAR: No apparent abnormalities. No lower leg edema. No varicosities. Pedal pulses are palpable.\par NEUROLOGIC: Sensation is normal, no muscle weakness in the upper or lower extremities.\par DERMATOLOGIC: No apparent skin lesions, moist, warm, no rash.\par SPINE: Cervical spine appears normal and moves freely; thoracic spine appears normal and moves freely; lumbosacral spine appears normal and moves freely, normal, nontender.\par MUSCULOSKELETAL: Hands, wrists, and elbows are normal and move freely, shoulders are normal and move freely. \par Musculoskeletal: Gait: normal and not antalgic . Left knee exam shows large joint effusion, 5 to 7 degree flexion contracture, 110 degrees of flexion, slight varus alignment, crepitus with ROM. \par \par b/l LE weakness 3/5.\par  [de-identified] : I reviewed xray from the facility shows severe O.A of left knee. no fracture or dislocation.

## 2019-11-19 NOTE — PROCEDURE
[de-identified] : i aspirated yellow slight cloudy synovial fluid of 90 CC and provided cortisone injection\par \par I discussed at length with the patient the planned steroid and lidocaine injection for primary osteoarthritis. The risks, benefits, convalescence and alternatives were reviewed and pt consented for injection. The possible side effects discussed included but were not limited to: pain, swelling, heat, bleeding, and redness. Symptoms are generally mild but if they are extensive then contact the office. Giving pain relievers by mouth such as NSAIDs or Tylenol can generally treat the reactions to steroid and lidocaine. Rare cases of infection have been noted. Rash, hives and itching may occur post injection. If you have muscle pain or cramps, flushing and or swelling of the face, rapid heart beat, nausea, dizziness, fever, chills, headache, difficulty breathing, swelling in the arms or legs, or have a prickly feeling of your skin, contact a health care provider immediately. Following this discussion, the knee was prepped with Alcohol and under sterile condition the 80 mg Depo-Medrol and 6 cc Lidocaine injection was performed with a 20 gauge needle through a superolateral injection site. The needle was introduced into the joint, aspiration was performed to ensure intra-articular placement and the medication was injected. Upon withdrawal of the needle the site was cleaned with alcohol and a band aid applied. The patient tolerated the injection well and there were no adverse effects. Post injection instructions included no strenuous activity for 24 hours, cryotherapy and if there are any adverse effects to contact the office.\par

## 2019-11-19 NOTE — REVIEW OF SYSTEMS
[Joint Stiffness] : joint stiffness [Joint Pain] : joint pain [Joint Swelling] : joint swelling [Negative] : Heme/Lymph [[id number]] : left knee

## 2019-11-23 NOTE — ED ADULT NURSE REASSESSMENT NOTE - NS ED NURSE REASSESS COMMENT FT1
pt sitting calm in bed. alert and confused. breathing even  and unlabored. 1 to 1 in place. awaiting dispo from provider. will continue to monitor.

## 2019-11-23 NOTE — ED PROVIDER NOTE - OBJECTIVE STATEMENT
This patient is an 84 year old man hx of dementia BIBA after a witnessed fall at nursing home.  No LOC.  Patient has no complaints. This patient is an 84 year old man hx of dementia BIBA after a witnessed fall at nursing home.  No LOC.  Patient has no complaints.  As per EMS patient's medications include coumadin. This patient is an 84 year old man hx of dementia BIBA after an unwitnessed fall at nursing home.  No LOC.  Patient has no complaints.  As per EMS patient's medications include coumadin.

## 2019-11-23 NOTE — ED PROVIDER NOTE - CLINICAL SUMMARY MEDICAL DECISION MAKING FREE TEXT BOX
84 year old s/p fall from NH on coumadin.  CT negative for ICH.  Family concerned about mental status since being at the NH.  UA negative for UTI.  Patient sent back to NH.

## 2019-11-23 NOTE — ED PROVIDER NOTE - PMH
Afib    BPH (benign prostatic hyperplasia)    COPD (chronic obstructive pulmonary disease) with emphysema    DM (diabetes mellitus)    Hypertension    Overactive bladder    Rheumatoid arthritis

## 2019-11-23 NOTE — ED PROVIDER NOTE - PROGRESS NOTE DETAILS
Patient's wife and daughter arrived at bedside.  They state that patient looks well today.  Yesterday he seemed very confused.  He has never been confused before but it has been fluctuating for the past 2 weeks since being at the rehab.  They send urine was sent but they will get no result until sometimes next week.

## 2019-11-23 NOTE — ED ADULT NURSE REASSESSMENT NOTE - NS ED NURSE REASSESS COMMENT FT1
received report from mj ramirez and assumed care of pt. pt sitting calm in bed. alert, able to state name, pleasantly confused, baseline as per previous rn. breathing even and unlabored. pt has no complaints at this time. awaiting ua for dispo. pt currently on 1 to 1. will continue to monitor.

## 2019-11-23 NOTE — ED ADULT TRIAGE NOTE - CHIEF COMPLAINT QUOTE
Patient BIBA, unwitnessed fall at nursing home, unknown LOC, on coumadin, hx of dementia, MD Hassan to bedside upon arrival - priority CT

## 2019-11-23 NOTE — ED PROVIDER NOTE - PATIENT PORTAL LINK FT
You can access the FollowMyHealth Patient Portal offered by [facility] by registering at the following website: [email or web address]. By joining [employer]’s FollowMyHealth portal, you will also be able to view your health information using other applications (apps) compatible with our system.

## 2019-11-27 NOTE — ED ADULT NURSE NOTE - NSIMPLEMENTINTERV_GEN_ALL_ED
Implemented All Fall with Harm Risk Interventions:  [place] to call system. Call bell, personal items and telephone within reach. Instruct patient to call for assistance. Room bathroom lighting operational. Non-slip footwear when patient is off stretcher. Physically safe environment: no spills, clutter or unnecessary equipment. Stretcher in lowest position, wheels locked, appropriate side rails in place. Provide visual cue, wrist band, yellow gown, etc. Monitor gait and stability. Monitor for mental status changes and reorient to person, place, and time. Review medications for side effects contributing to fall risk. Reinforce activity limits and safety measures with patient and family. Provide visual clues: red socks.

## 2019-11-27 NOTE — ED PROVIDER NOTE - OBJECTIVE STATEMENT
85 yo male pmh dementia comes to ed s/p fall with head injury and upper back pain; pt denies loc; code trauma b called;   denies any chest pain , abdominal pain

## 2019-11-27 NOTE — CONSULT NOTE ADULT - ASSESSMENT
84 male w/ PMHx of HTN, DM, Afib on coumadin, presents from NH s/p fall from standing position. Pt states that he stood from his chair to look at his meal tray when his left knee, which he states he has chronic weakness, gave out and he fell on his right side hitting his head. Pt admits to severe back pain s/p fall.  CT significant for  L3 vertebral body fracture. 84 male w/ PMHx of HTN, DM, Afib on coumadin, presents from NH s/p fall from standing position. Pt states that he stood from his chair to look at his meal tray when his left knee, which he states he has chronic weakness, gave out and he fell on his right side hitting his head. Pt admits to severe back pain s/p fall.  CT significant for  L3 vertebral body fracture.        - Discussed case w/ Dr. Shahid  - recommend MR CTL, discussed w/ primary team  - continue to coordinate care w/ primary team

## 2019-11-27 NOTE — CONSULT NOTE ADULT - ATTENDING COMMENTS
NSGY Attg:    see above    patient seen and examined by pa staff    CT and MRI LS spine reviewed -- acute L3 VB fracture without significant height loss or retropulsion    agree with plan as above  patient will need LSO brace when OOB

## 2019-11-27 NOTE — CONSULT NOTE ADULT - SUBJECTIVE AND OBJECTIVE BOX
Patient is a 84y old  Male who presents with a chief complaint of s/p fall (27 Nov 2019 09:22)    HPI:  84M PMHx HTN, COPD, DM, Afib on coumadin,, BPH, HLD, CVA, TIA, RA, Hypertensive EncephalopathySent from NH for fall from standing with + LOC.  Currently c/o back pain only.  Denies any other complaints. (27 Nov 2019 09:22)      HISTORY OF PRESENT ILLNESS:   84 male w/ PMHx of HTN, DM, Afib on coumadin, presents from NH s/p fall from standing position. Pt states that he stood from his chair to look at his meal tray when his left knee, which he states he has chronic weakness, gave out and he fell on his right side hitting his head. Pt admits to severe back pain s/p fall.  CT significant for  L3 vertebral body fracture. Neurosurgery called for further evaluation.      PAST MEDICAL & SURGICAL HISTORY:  Hypertensive encephalopathy  Transient ischemic attack (TIA)  Cerebrovascular accident (CVA)  HLD (hyperlipidemia)  COPD (chronic obstructive pulmonary disease) with emphysema  Rheumatoid arthritis  Overactive bladder  Hypertension  BPH (benign prostatic hyperplasia)  Afib  DM (diabetes mellitus)  No significant past surgical history    FAMILY HISTORY:  No pertinent family history in first degree relatives    Allergies    No Known Allergies    Intolerances      REVIEW OF SYSTEMS  CONSTITUTIONAL: No fever, weight loss, or fatigue  EYES: No eye pain, visual disturbances, or discharge  ENMT:  No difficulty hearing, tinnitus, vertigo; No sinus or throat pain  NECK: No pain or stiffness  RESPIRATORY: No cough, wheezing, chills or hemoptysis; No shortness of breath  CARDIOVASCULAR: No chest pain, palpitations, dizziness, or leg swelling  GASTROINTESTINAL: No abdominal or epigastric pain. No nausea, vomiting, or hematemesis; No diarrhea or constipation. No melena or hematochezia.  GENITOURINARY: No dysuria, frequency, hematuria, or incontinence  NEUROLOGICAL: No headaches, memory loss, loss of strength, numbness, or tremors  SKIN: No itching, burning, rashes, or lesions   LYMPH NODES: No enlarged glands  ENDOCRINE: No heat or cold intolerance; No hair loss  MUSCULOSKELETAL: No joint pain or swelling; No muscle, + mid back pain, no extremity pain  PSYCHIATRIC: No depression, anxiety, mood swings, or difficulty sleeping  HEME/LYMPH: No easy bruising, or bleeding gums  ALLERY AND IMMUNOLOGIC: No hives or eczema    HOME MEDICATIONS:  Home Medications:  amLODIPine 5 mg oral tablet: 1 tab(s) orally once a day (27 Nov 2019 09:37)  atorvastatin 20 mg oral tablet: 1 tab(s) orally once a day (at bedtime) (27 Nov 2019 09:37)  Cardizem  mg/24 hours oral capsule, extended release: 1 cap(s) orally once a day (27 Nov 2019 09:37)  cholecalciferol 50,000 intl units oral capsule: 1 cap(s) orally once a week (27 Nov 2019 14:13)  Coumadin 5 mg oral tablet: 1 tab(s) orally once a day (27 Nov 2019 09:37)  finasteride 1 mg oral tablet: 1 tab(s) orally once a day (27 Nov 2019 09:37)  glipiZIDE 5 mg oral tablet: 2 tab(s) orally 2 times a day (27 Nov 2019 09:37)  HumaLOG KwikPen: sliding scale:  201-250=2 units  251-300=4 units  301-350= 6 units  351-400= 8 units (27 Nov 2019 14:13)  melatonin 5 mg oral tablet: 1 tab(s) orally once a day (at bedtime) (27 Nov 2019 09:37)  metFORMIN 1000 mg oral tablet: 1 tab(s) orally 2 times a day (27 Nov 2019 09:37)  ramipril 5 mg oral capsule: 1 cap(s) orally once a day (27 Nov 2019 09:37)  tamsulosin 0.4 mg oral capsule: 1 cap(s) orally once a day (27 Nov 2019 09:37)  Vitamin D3 50,000 intl units oral capsule: 1 cap(s) orally once a week (27 Nov 2019 09:37)      MEDICATIONS:  Antibiotics:    Neuro:    Anticoagulation:    OTHER:  atorvastatin 20 milliGRAM(s) Oral at bedtime  dextrose 40% Gel 15 Gram(s) Oral once PRN  dextrose 50% Injectable 12.5 Gram(s) IV Push once  dextrose 50% Injectable 25 Gram(s) IV Push once  dextrose 50% Injectable 25 Gram(s) IV Push once  diltiazem    milliGRAM(s) Oral daily  finasteride 5 milliGRAM(s) Oral daily  glucagon  Injectable 1 milliGRAM(s) IntraMuscular once PRN  insulin regular  human corrective regimen sliding scale   SubCutaneous every 6 hours  lisinopril 20 milliGRAM(s) Oral daily  tamsulosin 0.4 milliGRAM(s) Oral at bedtime    IVF:  dextrose 5%. 1000 milliLiter(s) IV Continuous <Continuous>  sodium chloride 0.9%. 1000 milliLiter(s) IV Continuous <Continuous>      Vital Signs Last 24 Hrs  T(C): 36.7 (27 Nov 2019 09:18), Max: 36.7 (27 Nov 2019 09:18)  T(F): 98.1 (27 Nov 2019 09:18), Max: 98.1 (27 Nov 2019 09:18)  HR: 105 (27 Nov 2019 09:18) (105 - 105)  BP: 148/81 (27 Nov 2019 09:18) (148/81 - 148/81)  BP(mean): --  RR: 16 (27 Nov 2019 09:18) (16 - 16)  SpO2: 95% (27 Nov 2019 09:18) (95% - 95%)      PHYSICAL EXAM:  GENERAL: well-groomed, well-developed  HEAD:  Atraumatic, normocephalic  EYES: Conjunctiva and sclera clear; corneal reflex intact  ENMT: No tonsillar erythema, exudates, or enlargement; moist mucous membranes  NECK: Supple, no JVD, dormal  MENTAL STATUS: AAO x2; Awake; Opens eyes spontaneously; Appropriately conversant without aphasia; following simple commands  CRANIAL NERVES: Visual acuity normal for age, visual fields full to confrontation, PERRL. EOMI without nystagmus. Facial sensation intact V1-3 distribution b/l.   MOTOR: strength 5/5 b/l upper and lower extremities  Uppers     Delt     Bicep     Tricep     HG  R                5/5       5/5         5/5         5/5  L                5/5       5/5         5/5         5/5  Lowers      HF     KF      KE     PF     DF     EHL  R             5/5     5/5     5/5    5/5   5/5    5/5  L              5/5    5/5      5/5    5/5   5/5    5/5  CHEST/LUNG: +breath sounds bilaterally;   HEART: +S1/+S2; irregular rate, irregular rhythm;   ABDOMEN: Soft, nontender, nondistended      LABS:                        11.2   10.58 )-----------( 224      ( 27 Nov 2019 09:29 )             34.9     11-27    135  |  100  |  31.0<H>  ----------------------------<  215<H>  5.2   |  23.0  |  1.10    Ca    9.5      27 Nov 2019 09:29    TPro  7.5  /  Alb  3.6  /  TBili  0.3<L>  /  DBili  x   /  AST  24  /  ALT  23  /  AlkPhos  52  11-27    PT/INR - ( 27 Nov 2019 09:29 )   PT: 24.5 sec;   INR: 2.08 ratio         PTT - ( 27 Nov 2019 09:29 )  PTT:27.7 sec      RADIOLOGY & ADDITIONAL STUDIES:    EXAM:  CT ABDOMEN AND PELVIS IC                        EXAM:  CT CHEST IC                          *** ADDENDUM 11/27/2019  ***  Addendum:  Please correct this report. Additionally noted is a compression fracture   in L3 vertebral body. Dr. Dejesus discussed this finding with Dr. Gaytan.  *** END OF ADDENDUM 11/27/2019  ***  PROCEDURE DATE:  11/27/2019      INTERPRETATION:  CLINICAL INFORMATION: Trauma  COMPARISON: Prior chest CT from 4/22/2013  IMPRESSION:   No acute intrathoracic or abdominal injury visualized.  Pleural calcifications. Is there a history of asbestos exposure?   Umbilical hernia containing fat mild small bowel without obstruction.   Prominent trabeculae in the right iliac bone. 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 Please correlate clinically. Additional findings as   above.      ***Please see the addendum at the top of this report. It may contain   additional important information or changes.****

## 2019-11-27 NOTE — H&P ADULT - HISTORY OF PRESENT ILLNESS
84MSent from NH for fall from standing, c/o back pain on coumadin.  Denies any other complaints. 84M PMHx HTN, COPD, DM, Afib on coumadin,, BPH, HLD, CVA, TIA, RA, Hypertensive EncephalopathySent from NH for fall from standing with + LOC.  Currently c/o back pain only.  Denies any other complaints.

## 2019-11-27 NOTE — H&P ADULT - NSICDXPASTMEDICALHX_GEN_ALL_CORE_FT
PAST MEDICAL HISTORY:  Afib     BPH (benign prostatic hyperplasia)     Cerebrovascular accident (CVA)     COPD (chronic obstructive pulmonary disease) with emphysema     DM (diabetes mellitus)     HLD (hyperlipidemia)     Hypertension     Hypertensive encephalopathy     Overactive bladder     Rheumatoid arthritis     Transient ischemic attack (TIA)

## 2019-11-27 NOTE — ED PROVIDER NOTE - CARE PLAN
Principal Discharge DX:	Acute low back pain, unspecified back pain laterality, unspecified whether sciatica present

## 2019-11-27 NOTE — H&P ADULT - RS GEN PE MLT RESP DETAILS PC
breath sounds equal/airway patent/clear to auscultation bilaterally/no chest wall tenderness/no intercostal retractions/respirations non-labored/good air movement/normal

## 2019-11-27 NOTE — ED ADULT TRIAGE NOTE - CHIEF COMPLAINT QUOTE
Pt with unwitnessed fall at [facility] and is c/o mid back pain and HA on Coumadin. MD Lawton at bedside. Code trauma B called.

## 2019-11-27 NOTE — H&P ADULT - ASSESSMENT
84M PMHx as above s/p fall    - labs, CXR, pan scan per protocol  - tertiary exam  - plan pending results of above

## 2019-11-27 NOTE — ED ADULT NURSE NOTE - PMH
Afib    BPH (benign prostatic hyperplasia)    Cerebrovascular accident (CVA)    COPD (chronic obstructive pulmonary disease) with emphysema    DM (diabetes mellitus)    HLD (hyperlipidemia)    Hypertension    Hypertensive encephalopathy    Overactive bladder    Rheumatoid arthritis    Transient ischemic attack (TIA)

## 2019-11-27 NOTE — ED ADULT NURSE NOTE - CHIEF COMPLAINT QUOTE
Pt with unwitnessed fall at [facility] and is c/o mid back pain and HA on Coumadin. MD Lawton at bedside. Code trauma B called.

## 2019-11-28 NOTE — PROGRESS NOTE ADULT - SUBJECTIVE AND OBJECTIVE BOX
HPI/OVERNIGHT EVENTS:    Patient examined at bedside on the 5th floor, patient sleeping, refers no pain, no complains. patient nauseous, ordered Zofran. Patient hypertension to 180/80 and 168/80, 10 hydralazin IV given.   Patient denies chest pain, SOB, f/c or further complains.     MEDICATIONS  (STANDING):  atorvastatin 20 milliGRAM(s) Oral at bedtime  dextrose 5%. 1000 milliLiter(s) (50 mL/Hr) IV Continuous <Continuous>  dextrose 50% Injectable 12.5 Gram(s) IV Push once  dextrose 50% Injectable 25 Gram(s) IV Push once  dextrose 50% Injectable 25 Gram(s) IV Push once  diltiazem    milliGRAM(s) Oral daily  finasteride 5 milliGRAM(s) Oral daily  insulin regular  human corrective regimen sliding scale   SubCutaneous every 6 hours  lisinopril 20 milliGRAM(s) Oral daily  metFORMIN 1000 milliGRAM(s) Oral Once  sodium chloride 0.9%. 1000 milliLiter(s) (50 mL/Hr) IV Continuous <Continuous>  tamsulosin 0.4 milliGRAM(s) Oral at bedtime    MEDICATIONS  (PRN):  dextrose 40% Gel 15 Gram(s) Oral once PRN Blood Glucose LESS THAN 70 milliGRAM(s)/deciLiter  glucagon  Injectable 1 milliGRAM(s) IntraMuscular once PRN Glucose <70 milliGRAM(s)/deciLiter  ondansetron Injectable 4 milliGRAM(s) IV Push every 4 hours PRN Nausea and/or Vomiting      Vital Signs Last 24 Hrs  T(C): 37.1 (28 Nov 2019 01:16), Max: 37.1 (27 Nov 2019 22:56)  T(F): 98.7 (28 Nov 2019 01:16), Max: 98.7 (27 Nov 2019 22:56)  HR: 92 (28 Nov 2019 01:16) (92 - 105)  BP: 180/80 (28 Nov 2019 01:16) (148/80 - 180/80)  BP(mean): --  RR: 18 (28 Nov 2019 01:16) (16 - 18)  SpO2: 97% (28 Nov 2019 01:16) (95% - 97%)    Constitutional: patient resting comfortably in bed, in no acute distress  HEENT: EOMI / PERRL b/l, no active drainage or redness  Neck: No JVD, full ROM without pain  Respiratory: respirations are unlabored, no accessory muscle use, no conversational dyspnea  Cardiovascular: regular rate & rhythm  Gastrointestinal: Abdomen soft, non-tender, non-distended, no rebound tenderness / guarding  Neurological: GCS: 15 (4/5/6). A&O x 3; no gross sensory / motor / coordination deficits      I&O's Detail      LABS:                        11.2   10.58 )-----------( 224      ( 27 Nov 2019 09:29 )             34.9     11-27    135  |  100  |  31.0<H>  ----------------------------<  215<H>  5.2   |  23.0  |  1.10    Ca    9.5      27 Nov 2019 09:29    TPro  7.5  /  Alb  3.6  /  TBili  0.3<L>  /  DBili  x   /  AST  24  /  ALT  23  /  AlkPhos  52  11-27    PT/INR - ( 27 Nov 2019 09:29 )   PT: 24.5 sec;   INR: 2.08 ratio         PTT - ( 27 Nov 2019 09:29 )  PTT:27.7 sec

## 2019-11-28 NOTE — PROGRESS NOTE ADULT - SUBJECTIVE AND OBJECTIVE BOX
INTERVAL HPI/OVERNIGHT EVENTS:  admitted on 11/27   84ym s/p fall   Phmx: HTN, DM, Afib on coumadin,   Pt oriented self, sleep when seen with Dr. Shahid this am. Returned for a repeat exam- pt opened eyes when asked to his name. Min followed commands.   with lots of encouragement followed.     MEDICATIONS  (STANDING):  amLODIPine   Tablet 5 milliGRAM(s) Oral daily  atorvastatin 20 milliGRAM(s) Oral at bedtime  dextrose 5%. 1000 milliLiter(s) (50 mL/Hr) IV Continuous <Continuous>  dextrose 50% Injectable 12.5 Gram(s) IV Push once  dextrose 50% Injectable 25 Gram(s) IV Push once  dextrose 50% Injectable 25 Gram(s) IV Push once  diltiazem    milliGRAM(s) Oral daily  enoxaparin Injectable 75 milliGRAM(s) SubCutaneous every 12 hours  finasteride 5 milliGRAM(s) Oral daily  insulin lispro (HumaLOG) corrective regimen sliding scale   SubCutaneous every 6 hours  lisinopril 20 milliGRAM(s) Oral daily  metFORMIN 1000 milliGRAM(s) Oral two times a day  tamsulosin 0.4 milliGRAM(s) Oral at bedtime  warfarin 5 milliGRAM(s) Oral daily    MEDICATIONS  (PRN):  dextrose 40% Gel 15 Gram(s) Oral once PRN Blood Glucose LESS THAN 70 milliGRAM(s)/deciLiter  glucagon  Injectable 1 milliGRAM(s) IntraMuscular once PRN Glucose <70 milliGRAM(s)/deciLiter  ondansetron Injectable 4 milliGRAM(s) IV Push every 4 hours PRN Nausea and/or Vomiting      Allergies    No Known Allergies    Intolerances          Vital Signs Last 24 Hrs  T(C): 36.9 (28 Nov 2019 15:54), Max: 37.3 (28 Nov 2019 09:49)  T(F): 98.5 (28 Nov 2019 15:54), Max: 99.2 (28 Nov 2019 09:49)  HR: 107 (28 Nov 2019 15:54) (66 - 107)  BP: 153/77 (28 Nov 2019 15:54) (136/69 - 180/80)  BP(mean): --  RR: 18 (28 Nov 2019 15:54) (18 - 18)  SpO2: 95% (28 Nov 2019 15:54) (95% - 97%) BMI (kg/m2): 23 (11-28-19 @ 11:56)      PHYSICAL EXAM  GENERAL: NAD, well-groomed, well-developed  HEAD:  Atraumatic, Normocephalic  EYES: EOMI, PERRLA, conjunctiva and sclera clear  ENMT: No tonsillar erythema, exudates, or enlargement; Moist mucous membranes, Good dentition, No lesions  NECK: Supple,   NERVOUS SYSTEM:  Alert & Oriented X1 ; Motor Strength 5/5 B/L upper and lower extremities; DTRs 2+ intact and symmetric      LABS:                          11.9   9.04  )-----------( 224      ( 28 Nov 2019 07:49 )             36.1     11-28    134<L>  |  99  |  20.0  ----------------------------<  237<H>  4.5   |  23.0  |  0.91    Ca    9.7      28 Nov 2019 07:49  Phos  3.0     11-28  Mg     1.5     11-28    TPro  7.5  /  Alb  3.6  /  TBili  0.3<L>  /  DBili  x   /  AST  24  /  ALT  23  /  AlkPhos  52  11-27    PT/INR - ( 28 Nov 2019 07:49 )   PT: 21.3 sec;   INR: 1.82 ratio         PTT - ( 28 Nov 2019 07:49 )  PTT:29.9 sec    I&O's Detail    RADIOLOGY & ADDITIONAL TESTS:  < from: MR Lumbar Spine No Cont (11.28.19 @ 14:30) >   EXAM:  MR SPINE LUMBAR                        PROCEDURE DATE:  11/28/2019    IMPRESSION:   Acute fracture of the L3 vertebral body. No significant bony retropulsion.  < end of copied text >    < from: CT Cervical Spine No Cont (11.27.19 @ 09:52) >   EXAM:  CT CERVICAL SPINE                        PROCEDURE DATE:  11/27/2019    IMPRESSION: No evidence for acute displaced fracture or malalignment.  < end of copied text >

## 2019-11-29 NOTE — PHYSICAL THERAPY INITIAL EVALUATION ADULT - GENERAL OBSERVATIONS, REHAB EVAL
Patient received lying in bed, NAD, breathing RA, +IV, +venous compression device. Pt agreeable to Physical Therapy evaluation.

## 2019-11-29 NOTE — PHYSICAL THERAPY INITIAL EVALUATION ADULT - ADDITIONAL COMMENTS
Patient is questionable historian, mild confusion noted during session. Patient lives in an apartment on the second floor with elevator access, not sure of presence of stairs. He lives with his wife who is available to provide 24/7 assist if necessary. He was at a rehab center prior to most recent admission, where he fell leading to readmit. He reports having cane, shower chair, commode, no RW at home.

## 2019-11-29 NOTE — PROGRESS NOTE ADULT - SUBJECTIVE AND OBJECTIVE BOX
INTERVAL HPI/OVERNIGHT EVENTS: none    SUBJECTIVE: Patient evaluated at bedside and found in no acute distress. Patient MRI showing acute L3 fracture. Neurosurgery with recs for PT and LSO brace as needed for comfort. Patient denies n/v, sob, chest pain, fever/chills, headaches at present.       MEDICATIONS  (STANDING):  amLODIPine   Tablet 5 milliGRAM(s) Oral daily  atorvastatin 20 milliGRAM(s) Oral at bedtime  dextrose 5%. 1000 milliLiter(s) (50 mL/Hr) IV Continuous <Continuous>  dextrose 50% Injectable 12.5 Gram(s) IV Push once  dextrose 50% Injectable 25 Gram(s) IV Push once  dextrose 50% Injectable 25 Gram(s) IV Push once  diltiazem    milliGRAM(s) Oral daily  enoxaparin Injectable 75 milliGRAM(s) SubCutaneous every 12 hours  finasteride 5 milliGRAM(s) Oral daily  insulin lispro (HumaLOG) corrective regimen sliding scale   SubCutaneous every 6 hours  lisinopril 20 milliGRAM(s) Oral daily  metFORMIN 1000 milliGRAM(s) Oral two times a day  tamsulosin 0.4 milliGRAM(s) Oral at bedtime  warfarin 5 milliGRAM(s) Oral daily    MEDICATIONS  (PRN):  dextrose 40% Gel 15 Gram(s) Oral once PRN Blood Glucose LESS THAN 70 milliGRAM(s)/deciLiter  glucagon  Injectable 1 milliGRAM(s) IntraMuscular once PRN Glucose <70 milliGRAM(s)/deciLiter  ondansetron Injectable 4 milliGRAM(s) IV Push every 4 hours PRN Nausea and/or Vomiting      Vital Signs Last 24 Hrs  T(C): 36.9 (28 Nov 2019 15:54), Max: 37.3 (28 Nov 2019 09:49)  T(F): 98.5 (28 Nov 2019 15:54), Max: 99.2 (28 Nov 2019 09:49)  HR: 107 (28 Nov 2019 15:54) (66 - 107)  BP: 153/77 (28 Nov 2019 15:54) (136/69 - 168/86)  BP(mean): --  RR: 18 (28 Nov 2019 15:54) (18 - 18)  SpO2: 95% (28 Nov 2019 15:54) (95% - 95%)    PE  Gen: resting comfortably in bed, no acute distress  Pulm: no increased wob, no conversational dyspnea  HEENT: normocephalic, atraumatic, no active drainage or redness  Neuro: GCS 15, A&Ox3 though somewhat uncooperative with provider in care. grossly sensory and motor intact      I&O's Detail      LABS:                        11.9   9.04  )-----------( 224      ( 28 Nov 2019 07:49 )             36.1     11-28    134<L>  |  99  |  20.0  ----------------------------<  237<H>  4.5   |  23.0  |  0.91    Ca    9.7      28 Nov 2019 07:49  Phos  3.0     11-28  Mg     1.5     11-28    TPro  7.5  /  Alb  3.6  /  TBili  0.3<L>  /  DBili  x   /  AST  24  /  ALT  23  /  AlkPhos  52  11-27    PT/INR - ( 28 Nov 2019 07:49 )   PT: 21.3 sec;   INR: 1.82 ratio         PTT - ( 28 Nov 2019 07:49 )  PTT:29.9 sec

## 2019-11-30 NOTE — PROGRESS NOTE ADULT - SUBJECTIVE AND OBJECTIVE BOX
HPI/OVERNIGHT EVENTS:    Patient evaluated at bedside and found in no acute distress. Patient MRI showing acute L3 fracture. Neurosurgery with recs for PT and LSO brace as needed for comfort. Patient denies n/v, sob, chest pain, fever/chills, headaches at present.  Pt received brace today just for comfort.     MEDICATIONS  (STANDING):  acetaminophen  IVPB .. 1000 milliGRAM(s) IV Intermittent once  amLODIPine   Tablet 5 milliGRAM(s) Oral daily  atorvastatin 20 milliGRAM(s) Oral at bedtime  dextrose 5%. 1000 milliLiter(s) (50 mL/Hr) IV Continuous <Continuous>  dextrose 50% Injectable 12.5 Gram(s) IV Push once  dextrose 50% Injectable 25 Gram(s) IV Push once  dextrose 50% Injectable 25 Gram(s) IV Push once  diltiazem    milliGRAM(s) Oral daily  enoxaparin Injectable 75 milliGRAM(s) SubCutaneous every 12 hours  finasteride 5 milliGRAM(s) Oral daily  insulin lispro (HumaLOG) corrective regimen sliding scale   SubCutaneous three times a day before meals  insulin lispro (HumaLOG) corrective regimen sliding scale   SubCutaneous at bedtime  lisinopril 20 milliGRAM(s) Oral daily  metFORMIN 1000 milliGRAM(s) Oral two times a day  polyethylene glycol 3350 17 Gram(s) Oral daily  senna 2 Tablet(s) Oral at bedtime  tamsulosin 0.4 milliGRAM(s) Oral at bedtime  warfarin 5 milliGRAM(s) Oral daily    MEDICATIONS  (PRN):  dextrose 40% Gel 15 Gram(s) Oral once PRN Blood Glucose LESS THAN 70 milliGRAM(s)/deciLiter  glucagon  Injectable 1 milliGRAM(s) IntraMuscular once PRN Glucose <70 milliGRAM(s)/deciLiter  ondansetron Injectable 4 milliGRAM(s) IV Push every 4 hours PRN Nausea and/or Vomiting      Vital Signs Last 24 Hrs  T(C): 37.2 (30 Nov 2019 00:08), Max: 37.2 (29 Nov 2019 08:33)  T(F): 98.9 (30 Nov 2019 00:08), Max: 99 (29 Nov 2019 08:33)  HR: 92 (30 Nov 2019 00:08) (83 - 112)  BP: 101/65 (30 Nov 2019 00:08) (101/65 - 130/72)  BP(mean): --  RR: 18 (30 Nov 2019 00:08) (18 - 20)  SpO2: 92% (29 Nov 2019 15:56) (92% - 92%)    Gen: resting comfortably in bed, no acute distress  Pulm: no increased wob, no conversational dyspnea  HEENT: normocephalic, atraumatic, no active drainage or redness  Neuro: GCS 15, A&Ox3 though somewhat uncooperative with provider in care. grossly sensory and motor intact      I&O's Detail      LABS:                        11.6   15.24 )-----------( 206      ( 29 Nov 2019 08:22 )             36.4     11-29    137  |  99  |  26.0<H>  ----------------------------<  244<H>  4.4   |  20.0<L>  |  1.26    Ca    9.4      29 Nov 2019 08:22  Phos  3.6     11-29  Mg     1.9     11-29      PT/INR - ( 29 Nov 2019 08:22 )   PT: 20.9 sec;   INR: 1.79 ratio         PTT - ( 29 Nov 2019 08:22 )  PTT:37.3 sec

## 2019-12-01 NOTE — PROGRESS NOTE ADULT - PROBLEM SELECTOR PROBLEM 1
Acute low back pain, unspecified back pain laterality, unspecified whether sciatica present
Fall from standing, initial encounter

## 2019-12-01 NOTE — PROGRESS NOTE ADULT - PROBLEM SELECTOR PLAN 1
dispensed LSO and socks.  All went without incident.  Demonstrated/discussed use.  Pt. to use as directed by Dr.  Please call [facility] with any questions.  [phone].
cont PT  discharge planning to PATTI

## 2019-12-01 NOTE — PROGRESS NOTE ADULT - SUBJECTIVE AND OBJECTIVE BOX
INTERVAL HPI/OVERNIGHT EVENTS:    No acute events overnight. Patient evaluated at bedside and found hemodynamically stable and in no acute distress. Discontinue lovenox yesterday, INR therapeutic 2.12 . Patient on LSO brace as needed for comfort. Patient denies n/v, sob, chest pain, fever/chills, headaches at present. Awaiting PATTI placement.    SUBJECTIVE:      MEDICATIONS  (STANDING):  acetaminophen   Tablet .. 650 milliGRAM(s) Oral every 6 hours  amLODIPine   Tablet 5 milliGRAM(s) Oral daily  atorvastatin 20 milliGRAM(s) Oral at bedtime  chlorhexidine 4% Liquid 1 Application(s) Topical daily  dextrose 5%. 1000 milliLiter(s) (50 mL/Hr) IV Continuous <Continuous>  dextrose 50% Injectable 12.5 Gram(s) IV Push once  dextrose 50% Injectable 25 Gram(s) IV Push once  dextrose 50% Injectable 25 Gram(s) IV Push once  diltiazem    milliGRAM(s) Oral daily  finasteride 5 milliGRAM(s) Oral daily  insulin lispro (HumaLOG) corrective regimen sliding scale   SubCutaneous three times a day before meals  insulin lispro (HumaLOG) corrective regimen sliding scale   SubCutaneous at bedtime  lisinopril 20 milliGRAM(s) Oral daily  metFORMIN 1000 milliGRAM(s) Oral two times a day  polyethylene glycol 3350 17 Gram(s) Oral daily  senna 2 Tablet(s) Oral at bedtime  tamsulosin 0.4 milliGRAM(s) Oral at bedtime    MEDICATIONS  (PRN):  dextrose 40% Gel 15 Gram(s) Oral once PRN Blood Glucose LESS THAN 70 milliGRAM(s)/deciLiter  glucagon  Injectable 1 milliGRAM(s) IntraMuscular once PRN Glucose <70 milliGRAM(s)/deciLiter  ondansetron Injectable 4 milliGRAM(s) IV Push every 4 hours PRN Nausea and/or Vomiting      Vital Signs Last 24 Hrs  T(C): 36.9 (01 Dec 2019 05:23), Max: 36.9 (30 Nov 2019 07:50)  T(F): 98.4 (01 Dec 2019 05:23), Max: 98.5 (30 Nov 2019 07:50)  HR: 86 (01 Dec 2019 05:23) (81 - 92)  BP: 116/66 (01 Dec 2019 05:23) (102/56 - 120/62)  BP(mean): --  RR: 17 (01 Dec 2019 05:23) (17 - 19)  SpO2: 94% (01 Dec 2019 05:23) (91% - 94%)    PHYSICAL EXAM:    General: lying in bed comfortably, no acute distress  Chest: non labored breathing, no conversational dyspnea  Abdomen: nondistended, nontender  Neuro: GCS 15, A&Ox3 grossly sensory and motor intact      I&O's Detail    30 Nov 2019 07:01  -  01 Dec 2019 06:56  --------------------------------------------------------  IN:  Total IN: 0 mL    OUT:    Voided: 300 mL  Total OUT: 300 mL    Total NET: -300 mL          LABS:                        9.8    8.24  )-----------( 152      ( 01 Dec 2019 06:38 )             30.4     11-30    142  |  107  |  46.0<H>  ----------------------------<  208<H>  4.4   |  21.0<L>  |  1.73<H>    Ca    8.9      30 Nov 2019 08:09  Phos  3.4     11-30  Mg     2.0     11-30      PT/INR - ( 01 Dec 2019 06:38 )   PT: 31.3 sec;   INR: 2.64 ratio         PTT - ( 30 Nov 2019 08:09 )  PTT:37.7 sec

## 2019-12-02 NOTE — PROGRESS NOTE ADULT - SUBJECTIVE AND OBJECTIVE BOX
INTERVAL HPI/OVERNIGHT EVENTS: none    SUBJECTIVE: patient evaluated at bedside and found in no acute distress. Patient with mild delirium throughout night and evening, but able to be re-directed. Patient denies pain at present. Overall doing well with no complaints or concerns. Denies sob, chest pain      MEDICATIONS  (STANDING):  acetaminophen   Tablet .. 975 milliGRAM(s) Oral every 6 hours  amLODIPine   Tablet 5 milliGRAM(s) Oral daily  atorvastatin 20 milliGRAM(s) Oral at bedtime  chlorhexidine 4% Liquid 1 Application(s) Topical daily  dextrose 5%. 1000 milliLiter(s) (50 mL/Hr) IV Continuous <Continuous>  dextrose 50% Injectable 12.5 Gram(s) IV Push once  dextrose 50% Injectable 25 Gram(s) IV Push once  dextrose 50% Injectable 25 Gram(s) IV Push once  diltiazem    milliGRAM(s) Oral daily  finasteride 5 milliGRAM(s) Oral daily  insulin lispro (HumaLOG) corrective regimen sliding scale   SubCutaneous three times a day before meals  insulin lispro (HumaLOG) corrective regimen sliding scale   SubCutaneous at bedtime  metFORMIN 1000 milliGRAM(s) Oral two times a day  multiple electrolytes Injection Type 1 1000 milliLiter(s) (100 mL/Hr) IV Continuous <Continuous>  polyethylene glycol 3350 17 Gram(s) Oral daily  senna 2 Tablet(s) Oral at bedtime  tamsulosin 0.4 milliGRAM(s) Oral at bedtime  warfarin 5 milliGRAM(s) Oral daily    MEDICATIONS  (PRN):  dextrose 40% Gel 15 Gram(s) Oral once PRN Blood Glucose LESS THAN 70 milliGRAM(s)/deciLiter  glucagon  Injectable 1 milliGRAM(s) IntraMuscular once PRN Glucose <70 milliGRAM(s)/deciLiter  ondansetron Injectable 4 milliGRAM(s) IV Push every 4 hours PRN Nausea and/or Vomiting      Vital Signs Last 24 Hrs  T(C): 37 (01 Dec 2019 23:20), Max: 37.1 (01 Dec 2019 12:01)  T(F): 98.6 (01 Dec 2019 23:20), Max: 98.7 (01 Dec 2019 12:01)  HR: 86 (01 Dec 2019 23:20) (77 - 98)  BP: 123/61 (01 Dec 2019 23:20) (104/64 - 125/65)  BP(mean): --  RR: 19 (01 Dec 2019 23:20) (17 - 20)  SpO2: 94% (01 Dec 2019 23:20) (90% - 95%)    PE  Gen:  Pulm:  CV:  Abd:  :  Ext:  Vasc:  Neuro:      I&O's Detail    30 Nov 2019 07:01  -  01 Dec 2019 07:00  --------------------------------------------------------  IN:  Total IN: 0 mL    OUT:    Voided: 300 mL  Total OUT: 300 mL    Total NET: -300 mL      01 Dec 2019 07:01  -  02 Dec 2019 05:08  --------------------------------------------------------  IN:  Total IN: 0 mL    OUT:    Voided: 320 mL  Total OUT: 320 mL    Total NET: -320 mL          LABS:                        9.8    8.24  )-----------( 152      ( 01 Dec 2019 06:38 )             30.4     12-01    139  |  106  |  55.0<H>  ----------------------------<  238<H>  4.7   |  20.0<L>  |  1.86<H>    Ca    8.6      01 Dec 2019 06:38  Phos  2.9     12-01  Mg     1.9     12-01      PT/INR - ( 01 Dec 2019 06:38 )   PT: 31.3 sec;   INR: 2.64 ratio         PTT - ( 30 Nov 2019 08:09 )  PTT:37.7 sec      RADIOLOGY & ADDITIONAL STUDIES: INTERVAL HPI/OVERNIGHT EVENTS: none    SUBJECTIVE: patient evaluated at bedside and found in no acute distress. Patient with mild delirium throughout night and evening, but able to be re-directed. Patient denies pain at present. Overall doing well with no complaints or concerns. Denies sob, chest pain, n/v, fever/chills.       MEDICATIONS  (STANDING):  acetaminophen   Tablet .. 975 milliGRAM(s) Oral every 6 hours  amLODIPine   Tablet 5 milliGRAM(s) Oral daily  atorvastatin 20 milliGRAM(s) Oral at bedtime  chlorhexidine 4% Liquid 1 Application(s) Topical daily  dextrose 5%. 1000 milliLiter(s) (50 mL/Hr) IV Continuous <Continuous>  dextrose 50% Injectable 12.5 Gram(s) IV Push once  dextrose 50% Injectable 25 Gram(s) IV Push once  dextrose 50% Injectable 25 Gram(s) IV Push once  diltiazem    milliGRAM(s) Oral daily  finasteride 5 milliGRAM(s) Oral daily  insulin lispro (HumaLOG) corrective regimen sliding scale   SubCutaneous three times a day before meals  insulin lispro (HumaLOG) corrective regimen sliding scale   SubCutaneous at bedtime  metFORMIN 1000 milliGRAM(s) Oral two times a day  multiple electrolytes Injection Type 1 1000 milliLiter(s) (100 mL/Hr) IV Continuous <Continuous>  polyethylene glycol 3350 17 Gram(s) Oral daily  senna 2 Tablet(s) Oral at bedtime  tamsulosin 0.4 milliGRAM(s) Oral at bedtime  warfarin 5 milliGRAM(s) Oral daily    MEDICATIONS  (PRN):  dextrose 40% Gel 15 Gram(s) Oral once PRN Blood Glucose LESS THAN 70 milliGRAM(s)/deciLiter  glucagon  Injectable 1 milliGRAM(s) IntraMuscular once PRN Glucose <70 milliGRAM(s)/deciLiter  ondansetron Injectable 4 milliGRAM(s) IV Push every 4 hours PRN Nausea and/or Vomiting      Vital Signs Last 24 Hrs  T(C): 37 (01 Dec 2019 23:20), Max: 37.1 (01 Dec 2019 12:01)  T(F): 98.6 (01 Dec 2019 23:20), Max: 98.7 (01 Dec 2019 12:01)  HR: 86 (01 Dec 2019 23:20) (77 - 98)  BP: 123/61 (01 Dec 2019 23:20) (104/64 - 125/65)  BP(mean): --  RR: 19 (01 Dec 2019 23:20) (17 - 20)  SpO2: 94% (01 Dec 2019 23:20) (90% - 95%)    PE  Gen: resting comfortably in bed, nad  Pulm: no increased wob, no conversational dyspnea  Neuro: delirious overnight, now improved      I&O's Detail    30 Nov 2019 07:01  -  01 Dec 2019 07:00  --------------------------------------------------------  IN:  Total IN: 0 mL    OUT:    Voided: 300 mL  Total OUT: 300 mL    Total NET: -300 mL      01 Dec 2019 07:01  -  02 Dec 2019 05:08  --------------------------------------------------------  IN:  Total IN: 0 mL    OUT:    Voided: 320 mL  Total OUT: 320 mL    Total NET: -320 mL          LABS:                        9.8    8.24  )-----------( 152      ( 01 Dec 2019 06:38 )             30.4     12-01    139  |  106  |  55.0<H>  ----------------------------<  238<H>  4.7   |  20.0<L>  |  1.86<H>    Ca    8.6      01 Dec 2019 06:38  Phos  2.9     12-01  Mg     1.9     12-01      PT/INR - ( 01 Dec 2019 06:38 )   PT: 31.3 sec;   INR: 2.64 ratio         PTT - ( 30 Nov 2019 08:09 )  PTT:37.7 sec      RADIOLOGY & ADDITIONAL STUDIES:

## 2019-12-03 NOTE — OCCUPATIONAL THERAPY INITIAL EVALUATION ADULT - ADDITIONAL COMMENTS
Unable to obtain information regarding pt prior level of functioning and home environment as pt is a poor historian due to confusion and decreased cognition. As per chart, pt arrived from [facility]. Information should be verified by family members. Unable to obtain information regarding pt prior level of functioning, social history and home environment as pt is a poor historian due to confusion and decreased cognition. As per chart, pt arrived from [facility]. Information should be verified by family members.

## 2019-12-03 NOTE — OCCUPATIONAL THERAPY INITIAL EVALUATION ADULT - RANGE OF MOTION EXAMINATION, UPPER EXTREMITY
bilateral shoulders with partial AROM to 90 degrees (due to spinal precautions); bilateral elbow extension and flexion AROM WFL; left digit extension and gross grasp AROM WFL; right digit extension AROM WFL, right gross grasp with 3/4 of AROM bilateral shoulders with AROM to 90 degrees (due to spinal precautions); bilateral elbow extension and flexion AROM WFL; left digit extension and gross grasp AROM WFL; right digit extension AROM WFL, right gross grasp with 3/4 of AROM (due to arthritic changes)

## 2019-12-03 NOTE — DISCHARGE NOTE PROVIDER - NSDCCPCAREPLAN_GEN_ALL_CORE_FT
PRINCIPAL DISCHARGE DIAGNOSIS  Diagnosis: Compression fracture of L3 vertebra, initial encounter  Assessment and Plan of Treatment: Follow up: Please call and make an appointment to see neuro/spine surgeon Dr. Shahid 1-2 weeks after discharge. Also, please call and make an appointment with your primary care physician as per your usual schedule.   Activity: LSO brace when out of bed, for comfort.  Diet: May continue regular diet.  Medications: Please take all medications listed on your discharge paperwork as prescribed. Tylenol and/or ibuprofen for pain relief, as needed.  Patient is advised to RETURN TO THE EMERGENCY DEPARTMENT for any of the following - worsening pain, fever/chills, nausea/vomiting, altered mental status, chest pain, shortness of breath, or any other new / worsening symptom.      SECONDARY DISCHARGE DIAGNOSES  Diagnosis: Atrial fibrillation  Assessment and Plan of Treatment: Please continue taking coumadin daily - your current home dose is 5mg daily. *** INR MUST BE CHECKED DAILY AND COUMADIN DOSE ADJUSTED ACCORDINGLY to meet goal INR = 2-3 *** Resume the remainder of your cardiac medications, as prescribed, and follow-up with your cardiologist after discharge.    Diagnosis: Diabetes mellitus  Assessment and Plan of Treatment: Please resume all home diabetes medications at current doses, monitor blood sugar at home, limit your intake of carbohydrates and sugars, and follow-up with your endocrinologist and/or your primary care provider as per your usual schedule.

## 2019-12-03 NOTE — OCCUPATIONAL THERAPY INITIAL EVALUATION ADULT - ORIENTATION, REHAB EVAL
person/when given cues for time pt shakes head "yes" for 2019 person/when given cues for time pt shakes head "yes" for 2019; pt unable to verbalize time or place

## 2019-12-03 NOTE — OCCUPATIONAL THERAPY INITIAL EVALUATION ADULT - PROPRIOCEPTION, LLE, OT EVAL
unable to assess due to pt with difficulty follow commands of assessment due to confusion and decreased cognition unable to assess due to pt with difficulty following commands of assessment due to confusion and decreased cognition unable to assess as pt with difficulty following commands of assessment due to confusion and decreased cognition

## 2019-12-03 NOTE — OCCUPATIONAL THERAPY INITIAL EVALUATION ADULT - PLANNED THERAPY INTERVENTIONS, OT EVAL
ADL retraining/balance training/fine motor coordination training/strengthening/toilet/bed mobility training/transfer training neuromuscular re-education/toilet/transfer training/bed mobility training/strengthening/ADL retraining/balance training/fine motor coordination training

## 2019-12-03 NOTE — DISCHARGE NOTE PROVIDER - CARE PROVIDER_API CALL
Anthony Shahid)  Neurosurgery  [facility], [address] [place] [address]  Phone: [phone]  Fax: [phone]  Follow Up Time:

## 2019-12-03 NOTE — PROGRESS NOTE ADULT - SUBJECTIVE AND OBJECTIVE BOX
INTERVAL HPI/OVERNIGHT EVENTS:    No acute events overnight. Patient evaluated at bedside and found hemodynamically stable and in no acute distress. States that he has been using intermittently the brace for comfort. Denies fevers, chills, nausea, vomiting, chest pain, SOB, or generalized malaise. Awaiting PATTI     SUBJECTIVE:      MEDICATIONS  (STANDING):  acetaminophen   Tablet .. 975 milliGRAM(s) Oral every 6 hours  amLODIPine   Tablet 5 milliGRAM(s) Oral daily  atorvastatin 20 milliGRAM(s) Oral at bedtime  dextrose 5%. 1000 milliLiter(s) (50 mL/Hr) IV Continuous <Continuous>  dextrose 50% Injectable 12.5 Gram(s) IV Push once  dextrose 50% Injectable 25 Gram(s) IV Push once  dextrose 50% Injectable 25 Gram(s) IV Push once  diltiazem    milliGRAM(s) Oral daily  finasteride 5 milliGRAM(s) Oral daily  insulin lispro (HumaLOG) corrective regimen sliding scale   SubCutaneous three times a day before meals  insulin lispro (HumaLOG) corrective regimen sliding scale   SubCutaneous at bedtime  metFORMIN 1000 milliGRAM(s) Oral two times a day  multiple electrolytes Injection Type 1 1000 milliLiter(s) (100 mL/Hr) IV Continuous <Continuous>  polyethylene glycol 3350 17 Gram(s) Oral daily  potassium acid phosphate/sodium acid phosphate tablet (K-PHOS No. 2) 1 Tablet(s) Oral four times a day with meals  senna 2 Tablet(s) Oral at bedtime  tamsulosin 0.4 milliGRAM(s) Oral at bedtime  warfarin 5 milliGRAM(s) Oral daily    MEDICATIONS  (PRN):  dextrose 40% Gel 15 Gram(s) Oral once PRN Blood Glucose LESS THAN 70 milliGRAM(s)/deciLiter  glucagon  Injectable 1 milliGRAM(s) IntraMuscular once PRN Glucose <70 milliGRAM(s)/deciLiter  ondansetron Injectable 4 milliGRAM(s) IV Push every 4 hours PRN Nausea and/or Vomiting      Vital Signs Last 24 Hrs  T(C): 36.9 (02 Dec 2019 22:00), Max: 37.1 (02 Dec 2019 09:15)  T(F): 98.4 (02 Dec 2019 22:00), Max: 98.8 (02 Dec 2019 09:15)  HR: 90 (02 Dec 2019 22:00) (82 - 91)  BP: 146/76 (02 Dec 2019 22:00) (105/52 - 146/76)  BP(mean): --  RR: 18 (02 Dec 2019 22:00) (18 - 19)  SpO2: 92% (02 Dec 2019 22:00) (90% - 94%)    PHYSICAL EXAM:  General: lying comfortably in bed in no acute distress  Chest: non-labored breathing, no conversational dyspnea  Abdomen: non-distended, soft and depressible, non-tender  Ext: no edema or cyanosis        I&O's Detail    01 Dec 2019 07:01  -  02 Dec 2019 07:00  --------------------------------------------------------  IN:  Total IN: 0 mL    OUT:    Voided: 320 mL  Total OUT: 320 mL    Total NET: -320 mL      02 Dec 2019 07:01  -  03 Dec 2019 05:47  --------------------------------------------------------  IN:    multiple electrolytes Injection Type 1: 1200 mL    Oral Fluid: 480 mL  Total IN: 1680 mL    OUT:  Total OUT: 0 mL    Total NET: 1680 mL          LABS:                        10.2   9.50  )-----------( 157      ( 02 Dec 2019 07:46 )             31.2     12-02    139  |  103  |  33.0<H>  ----------------------------<  203<H>  4.2   |  22.0  |  1.08    Ca    8.9      02 Dec 2019 07:46  Phos  2.0     12-02  Mg     1.9     12-02      PT/INR - ( 01 Dec 2019 06:38 )   PT: 31.3 sec;   INR: 2.64 ratio               RADIOLOGY & ADDITIONAL STUDIES:    ASSESSMENT AND PLAN:

## 2019-12-03 NOTE — OCCUPATIONAL THERAPY INITIAL EVALUATION ADULT - SENSORY TESTS
unable to assess due to pt with difficulty follow commands due to confusion and decreased cognition unable to assess due to pt with difficulty following commands due to confusion and decreased cognition unable to assess as pt with difficulty following commands due to confusion and decreased cognition

## 2019-12-03 NOTE — DISCHARGE NOTE PROVIDER - HOSPITAL COURSE
HPI: 84M PMHx HTN, COPD, DM, Afib on coumadin,, BPH, HLD, CVA, TIA, RA, Hypertensive Encephalopathy. Sent from NH for fall from standing with + LOC.  Currently c/o back pain only.  Denies any other complaints.         Hospital Course: Admission HPI: 84M PMHx HTN, COPD, DM, Afib on coumadin, BPH, HLD, CVA, TIA, RA, Hypertensive Encephalopathy. Sent from NH for fall from standing with + LOC.  Currently c/o back pain only.  Denies any other complaints.         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 CT cspine showed no evidence for acute displaced fracture or malalignment. CT CAP showed compression fracture in L3 vertebral body - confirmed on L spine CT which also demonstrated no significant bony retropulsion. Patient was admitted to trauma service & neurosx/spine service consulted, requested MRI which was performed. Neurosx/spine recommend LSO brace when OOB, for comfort. Patient had LSO brace fitted by orthodist and he worked with PT who recommended PATTI upon discharge. Patient's hospital course complicated by REGINALD which resolved after holding ACE inhibitor and administering IVF. Patient was restarted on home dose of coumadin - hx of afib & recent TIA. He was bridged with therapeutic lovenox until INR therapeutic. INR was supratherapeutic 12/3 AM - coumadin held. Plan for patient to have INR checked daily @ rehab and coumadin dosed accordingly. Patient currently remains hemodynamically well, tolerating diet, pain controlled, OOB ambulating, voiding, and having bowel function. Stable for d/c to rehab with outpatient follow-up.        Patient is advised to RETURN TO THE EMERGENCY DEPARTMENT for any of the following - worsening pain, fever/chills, nausea/vomiting, altered mental status, chest pain, shortness of breath, or any other new / worsening symptom.        Length of time preparing discharge > 30 minutes

## 2019-12-03 NOTE — OCCUPATIONAL THERAPY INITIAL EVALUATION ADULT - LEVEL OF INDEPENDENCE: SIT/STAND, REHAB EVAL
to be assessed to be assessed; pt deferring due to c/o increase hip pain with bed mobility and sitting edge of bed

## 2019-12-03 NOTE — DISCHARGE NOTE PROVIDER - NSDCMRMEDTOKEN_GEN_ALL_CORE_FT
amLODIPine 5 mg oral tablet: 1 tab(s) orally once a day  atorvastatin 20 mg oral tablet: 1 tab(s) orally once a day (at bedtime)  Cardizem  mg/24 hours oral capsule, extended release: 1 cap(s) orally once a day  cholecalciferol 50,000 intl units oral capsule: 1 cap(s) orally once a week  Coumadin 5 mg oral tablet: 1 tab(s) orally once a day  finasteride 1 mg oral tablet: 1 tab(s) orally once a day  glipiZIDE 5 mg oral tablet: 2 tab(s) orally 2 times a day  HumaLOG KwikPen: sliding scale:  201-250=2 units  251-300=4 units  301-350= 6 units  351-400= 8 units  melatonin 5 mg oral tablet: 1 tab(s) orally once a day (at bedtime)  metFORMIN 1000 mg oral tablet: 1 tab(s) orally 2 times a day  ramipril 5 mg oral capsule: 1 cap(s) orally once a day  tamsulosin 0.4 mg oral capsule: 1 cap(s) orally once a day  Vitamin D3 50,000 intl units oral capsule: 1 cap(s) orally once a week acetaminophen 325 mg oral tablet: 3 tab(s) orally every 6 hours  amLODIPine 5 mg oral tablet: 1 tab(s) orally once a day  atorvastatin 20 mg oral tablet: 1 tab(s) orally once a day (at bedtime)  Cardizem  mg/24 hours oral capsule, extended release: 1 cap(s) orally once a day  cholecalciferol 50,000 intl units oral capsule: 1 cap(s) orally once a week  Coumadin 5 mg oral tablet: 1 tab(s) orally once a day - ** PLEASE CHECK INR DAILY AND DOSE COUMADIN ACCORDINGLY. Goal INR = 2-3.   finasteride 5 mg oral tablet: 1 tab(s) orally once a day  glipiZIDE 5 mg oral tablet: 2 tab(s) orally 2 times a day  HumaLOG KwikPen: sliding scale:  201-250=2 units  251-300=4 units  301-350= 6 units  351-400= 8 units  melatonin 5 mg oral tablet: 1 tab(s) orally once a day (at bedtime)  metFORMIN 1000 mg oral tablet: 1 tab(s) orally 2 times a day  polyethylene glycol 3350 oral powder for reconstitution: 17 gram(s) orally once a day  ramipril 5 mg oral capsule: 1 cap(s) orally once a day  senna oral tablet: 2 tab(s) orally once a day (at bedtime)  tamsulosin 0.4 mg oral capsule: 1 cap(s) orally once a day  Vitamin D3 50,000 intl units oral capsule: 1 cap(s) orally once a week

## 2019-12-03 NOTE — OCCUPATIONAL THERAPY INITIAL EVALUATION ADULT - PHYSICAL ASSIST/NONPHYSICAL ASSIST, OT EVAL
verbal cues/1 person assist/nonverbal cues (demo/gestures) verbal cues/set-up required/nonverbal cues (demo/gestures)/1 person assist

## 2019-12-03 NOTE — OCCUPATIONAL THERAPY INITIAL EVALUATION ADULT - NS ASR FOLLOW COMMAND OT EVAL
pt requires increased time and repetition to process commands of tasks; pt requires cues to attend to tasks; pt requires cues to follow commands/able to follow single-step instructions/75% of the time able to follow single-step instructions/pt requires increased time and repetition to process commands of tasks; pt requires cues to attend to tasks; pt requires cues to sequence and problem solve all tasks/75% of the time

## 2019-12-03 NOTE — OCCUPATIONAL THERAPY INITIAL EVALUATION ADULT - MANUAL MUSCLE TESTING RESULTS, REHAB EVAL
bilateral shoulders grossly assessed with partial AROM against gravity 2/5; bilateral elbows grossly assessed with AROM against gravity 3/5; left gross grasp 4/5; right gross grasp 3/5 bilateral shoulders grossly assessed with partial AROM against gravity 2/5; bilateral elbows grossly assessed with AROM against gravity 3/5; left gross grasp 4/5; right gross grasp 2-/5

## 2019-12-03 NOTE — OCCUPATIONAL THERAPY INITIAL EVALUATION ADULT - PERTINENT HX OF CURRENT PROBLEM, REHAB EVAL
pt presents to ED with c/o of back pain s/p fall at [facility]; MRI of lumbar spine shows an acute fracture of the L3 vertebral body; no significant bony retropulsion Pt presents to ED with c/o of back pain s/p fall at [facility]. MRI of lumbar spine shows an acute fracture of the L3 vertebral body; no significant bony retropulsion.

## 2019-12-03 NOTE — OCCUPATIONAL THERAPY INITIAL EVALUATION ADULT - PRECAUTIONS/LIMITATIONS, REHAB EVAL
head of bed 30-45 degrees; TLSO brace for comfort/fall precautions/spinal precautions/aspiration precautions fall precautions/spinal precautions/aspiration precautions/head of bed 30-45 degrees; LSO brace for comfort

## 2019-12-04 NOTE — PROGRESS NOTE ADULT - ATTENDING COMMENTS
I have seen and examined the patient.   no new issues  dispo planning  dvt chemoprophylaxis
NSGY Attg:    see above    patient seen and examined    poor compliance with confrontational testing  distal LE 5/5 bilaterally    MRI reviewed -- acute L3 compression fracture without significant retropulsion    agree with plan as above  LSO brace when OOB
avss  doing well clinically  neuro intact  L3 fx  pending lso brace  PT eval  NS following .
avss  doing well clinically  pending dispo to rehab, PT eval, LSO brace
Agree with above assessment.  The patient was seen and examined by me.  The patient is with complaints of mild pain and difficulty mobilizing.  The patient was stating he was having difficulty urinating while in bed.  The patient is to have a bladder scan and may need to have he [place] catheter replaced to help with urination, may require Judd if unable to void.

## 2019-12-04 NOTE — DISCHARGE NOTE NURSING/CASE MANAGEMENT/SOCIAL WORK - PATIENT PORTAL LINK FT
You can access the FollowMyHealth Patient Portal offered by [facility] by registering at the following website: [email or web address]. By joining [facility]’s FollowMyHealth portal, you will also be able to view your health information using other applications (apps) compatible with our system.

## 2019-12-04 NOTE — DISCHARGE NOTE NURSING/CASE MANAGEMENT/SOCIAL WORK - NSDCPEPTSTRK_GEN_ALL_CORE
Prescribed medications/Stroke support groups for patients, families, and friends/Need for follow up after discharge/Stroke education booklet/Stroke warning signs and symptoms/Signs and symptoms of stroke/Call 911 for stroke/Risk factors for stroke

## 2019-12-04 NOTE — PROGRESS NOTE ADULT - ASSESSMENT
84M with multiple comorbilities on coumadin for unclear medical problems s/p fall resulting in acute onset of back pain. + LOC, GSC 14 upon arrival. Secondary was significant for thoracolumbar tenderness.     Plan    - NPO  - Labs in the morning,   - Spine precautions  - Spine consult.   - Neurosurgery recs:   	- Discussed case w/ Dr. Shahid  	- recommend MR CTL, discussed w/ primary team   	- continue to coordinate care w/ primary team  - PT OT  - Social 	Work    DISPO: pending MRI and clinical
84ym fell    Plan  1. Pt will be recomm physical therapy increase activity as tolerated.   2. rehab evaluation  3. may order brace for comfort if needed.
A/P: 84M with multiple comorbidities on coumadin for a fib s/p fall resulting in acute onset of back pain. + LOC, GSC 14 upon arrival. Secondary was significant for thoracolumbar tenderness.     - consistent carb diet  - Neurosurgery recs appreciated   - PT, TLSO brace as needed for comfort  - PT OT  - Dc therapeutic lovenox if INR appropriate, continue coumadin  -check HgbA1c  -monitor fasting sugars, will adjust SSI as appropriate. currently on SSI corrective 2,4,6,8,10, 12 and metformin 1000BID
A/P: 84M with multiple comorbidities on coumadin for a fib s/p fall resulting in acute onset of back pain. + LOC, GSC 14 upon arrival. Secondary was significant for thoracolumbar tenderness.     - consistent carb diet  - Neurosurgery recs appreciated - PT, TLSO brace as needed for comfort  - PT OT  - Dc therapeutic lovenox if INR appropriate, continue coumadin  -check HgbA1c  -monitor fasting sugars, will adjust SSI as appropriate. currently on SSI corrective 2,4,6,8,10, 12 and metformin 1000BID
A/P: 84M with multiple comorbidities on coumadin for a fib s/p fall resulting in acute onset of back pain. + LOC, GSC 14 upon arrival. Secondary was significant for thoracolumbar tenderness.     - consistent carb diet  - TLSO brace as needed for comfort  - Check HgbA1c  - Monitor fasting sugars  - Awaiting PATTI
A/P: 84M with multiple comorbidities on coumadin for a fib s/p fall resulting in acute onset of back pain. + LOC, GSC 14 upon arrival. Secondary was significant for thoracolumbar tenderness.     - consistent carb diet  - TLSO brace as needed for comfort  - Monitor fasting sugars  - Awaiting PATTI authorization by the insurance company
Pt. requires LSO with rigid anterior, posterior and lateral panels, interface socks.  Device to limit ROM, support spine, support Fx, reduce discomfort, promote healing.  Socks to prevent skin breakdown.
A/P: 84M with multiple comorbidities on coumadin for a fib s/p fall resulting in acute onset of back pain. + LOC, GSC 14 upon arrival. Secondary was significant for thoracolumbar tenderness.     - consistent carb diet  - TLSO brace as needed for comfort  - Monitor fasting sugars  - Awaiting PATTI

## 2019-12-04 NOTE — PROGRESS NOTE ADULT - SUBJECTIVE AND OBJECTIVE BOX
HPI/OVERNIGHT EVENTS:    No acute events overnight. Patient evaluated at bedside and found hemodynamically stable and in no acute distress. States that he has been using intermittently the brace for comfort. Denies fevers, chills, nausea, vomiting, chest pain, SOB, or generalized malaise. Awaiting PATTI authorization from the Insurance    MEDICATIONS  (STANDING):  acetaminophen   Tablet .. 975 milliGRAM(s) Oral every 6 hours  amLODIPine   Tablet 5 milliGRAM(s) Oral daily  atorvastatin 20 milliGRAM(s) Oral at bedtime  chlorhexidine 4% Liquid 1 Application(s) Topical daily  dextrose 5%. 1000 milliLiter(s) (50 mL/Hr) IV Continuous <Continuous>  dextrose 50% Injectable 12.5 Gram(s) IV Push once  dextrose 50% Injectable 25 Gram(s) IV Push once  dextrose 50% Injectable 25 Gram(s) IV Push once  diltiazem    milliGRAM(s) Oral daily  finasteride 5 milliGRAM(s) Oral daily  insulin lispro (HumaLOG) corrective regimen sliding scale   SubCutaneous three times a day before meals  insulin lispro (HumaLOG) corrective regimen sliding scale   SubCutaneous at bedtime  lisinopril 20 milliGRAM(s) Oral daily  metFORMIN 1000 milliGRAM(s) Oral two times a day  polyethylene glycol 3350 17 Gram(s) Oral daily  senna 2 Tablet(s) Oral at bedtime  tamsulosin 0.4 milliGRAM(s) Oral at bedtime    MEDICATIONS  (PRN):  dextrose 40% Gel 15 Gram(s) Oral once PRN Blood Glucose LESS THAN 70 milliGRAM(s)/deciLiter  glucagon  Injectable 1 milliGRAM(s) IntraMuscular once PRN Glucose <70 milliGRAM(s)/deciLiter  ondansetron Injectable 4 milliGRAM(s) IV Push every 4 hours PRN Nausea and/or Vomiting  oxyCODONE    IR 5 milliGRAM(s) Oral every 6 hours PRN Severe Pain (7 - 10)      Vital Signs Last 24 Hrs  T(C): 36.6 (04 Dec 2019 00:22), Max: 36.9 (03 Dec 2019 15:30)  T(F): 97.9 (04 Dec 2019 00:22), Max: 98.5 (03 Dec 2019 15:30)  HR: 98 (04 Dec 2019 00:22) (88 - 107)  BP: 144/64 (04 Dec 2019 00:22) (130/69 - 185/78)  BP(mean): --  RR: 18 (04 Dec 2019 00:22) (18 - 19)  SpO2: 91% (04 Dec 2019 00:22) (90% - 95%)    Constitutional: patient resting comfortably in bed, in no acute distress  HEENT: EOMI / PERRL b/l, no active drainage or redness  Neck: No JVD, full ROM without pain  Respiratory: respirations are unlabored, no accessory muscle use, no conversational dyspnea  Cardiovascular: regular rate & rhythm  Gastrointestinal: Abdomen soft, non-tender, non-distended, no rebound tenderness / guarding  Neurological: GCS: 15 (4/5/6). A&O x 3; no gross sensory / motor / coordination deficits      I&O's Detail    02 Dec 2019 07:01  -  03 Dec 2019 07:00  --------------------------------------------------------  IN:    multiple electrolytes Injection Type 1multiple electrolytes Injection Type 1: 1200 mL    Oral Fluid: 480 mL  Total IN: 1680 mL    OUT:  Total OUT: 0 mL    Total NET: 1680 mL          LABS:                        10.2   9.50  )-----------( 157      ( 02 Dec 2019 07:46 )             31.2     12-03    140  |  104  |  19.0  ----------------------------<  205<H>  4.3   |  24.0  |  0.83    Ca    8.8      03 Dec 2019 08:18  Phos  2.4     12-03  Mg     1.5     12-03      PT/INR - ( 03 Dec 2019 08:18 )   PT: 46.9 sec;   INR: 3.91 ratio         PTT - ( 03 Dec 2019 08:18 )  PTT:38.8 sec

## 2019-12-11 NOTE — DISCHARGE NOTE NURSING/CASE MANAGEMENT/SOCIAL WORK - NSDCPEPTCOWACOMP_GEN_ALL_CORE
The patient has received written discharge instructions for Warfarin/Coumadin, including the Warfarin/Coumadin discharge booklet, which contains all of the information listed below. Warfarin/Coumadin is used to prevent new blood clots and keep existing ones from getting bigger. Never skip a dose of Warfarin/Coumadin. If you forget to take your Warfarin/Coumadin, DO NOT take an extra pill to 'catch up'. NEVER TAKE A DOUBLE DOSE. Notify your doctor that you missed a dose. Take Warfarin/Coumadin in the evening at the same time. Warfarin/Coumadin may be taken with other medications or food.
no vascular compromise

## 2019-12-12 PROBLEM — I63.9 CEREBRAL INFARCTION, UNSPECIFIED: Chronic | Status: ACTIVE | Noted: 2019-01-01

## 2019-12-12 PROBLEM — G45.9 TRANSIENT CEREBRAL ISCHEMIC ATTACK, UNSPECIFIED: Chronic | Status: ACTIVE | Noted: 2019-01-01

## 2019-12-12 PROBLEM — I67.4 HYPERTENSIVE ENCEPHALOPATHY: Chronic | Status: ACTIVE | Noted: 2019-01-01

## 2019-12-12 PROBLEM — E78.5 HYPERLIPIDEMIA, UNSPECIFIED: Chronic | Status: ACTIVE | Noted: 2019-01-01

## 2019-12-19 PROBLEM — G93.9 BRAIN LESION: Status: ACTIVE | Noted: 2019-01-01

## 2019-12-19 PROBLEM — S32.030D CLOSED COMPRESSION FRACTURE OF L3 LUMBAR VERTEBRA WITH ROUTINE HEALING, SUBSEQUENT ENCOUNTER: Status: ACTIVE | Noted: 2019-01-01

## 2019-12-19 NOTE — REASON FOR VISIT
[Follow-Up: _____] : a [unfilled] follow-up visit [Family Member] : family member [Spouse] : spouse [FreeTextEntry1] : Ms. Franco presents for follow up visit/post hospitalization 11/27- 12/4/2019 [facility]. 84M PMHx HTN, COPD, DM, Afib on coumadin, BPH, HLD, CVA, TIA, RA, Hypertensive Encephalopathy. Sent from NH for fall from standing with + LOC.   He presents in a wheelchair accompanied by his wife and currently residing at [facility].  He presents and has been compliant with TLSO bracing when OOB.  He reports mild to moderate lower back pain.  Patient incontinent of urine for the past year.   Pain intensity 7/10.  Denies any numbness/tingling of extremities.  He is ambulating with assistance of a walker.  Patient has resumed his warfarin as directed.   Denies any headaches, n/v or visual disturbances.  He has not had any seizures.

## 2019-12-19 NOTE — REVIEW OF SYSTEMS
[Feeling Tired] : feeling tired [Memory Lapses or Loss] : memory loss [As Noted in HPI] : as noted in HPI [Poor Coordination] : poor coordination [Decr. Concentrating Ability] : decreased concentrating ability [Difficulty Walking] : difficulty walking [Inability to Walk] : inability to walk [Negative] : Heme/Lymph [Numbness] : no numbness [Seizures] : no convulsions [Tingling] : no tingling [Dizziness] : no dizziness [Tension Headache] : no tension-type headache [FreeTextEntry9] : lower back pain secondary to L3 compression fracture.

## 2019-12-19 NOTE — PHYSICAL EXAM
[General Appearance - Alert] : alert [General Appearance - In No Acute Distress] : in no acute distress [Oriented To Time, Place, And Person] : oriented to person, place, and time [Affect] : the affect was normal [Person] : oriented to person [Time] : oriented to time [Place] : oriented to place [Concentration Intact] : normal concentrating ability [Fluency] : fluency intact [Comprehension] : comprehension intact [Cranial Nerves Optic (II)] : visual acuity intact bilaterally,  pupils equal round and reactive to light [Cranial Nerves Oculomotor (III)] : extraocular motion intact [Cranial Nerves Trigeminal (V)] : facial sensation intact symmetrically [Cranial Nerves Facial (VII)] : face symmetrical [Cranial Nerves Glossopharyngeal (IX)] : tongue and palate midline [Cranial Nerves Accessory (XI - Cranial And Spinal)] : head turning and shoulder shrug symmetric [Cranial Nerves Hypoglossal (XII)] : there was no tongue deviation with protrusion [Motor Tone] : muscle tone was normal in all four extremities [Motor Strength] : muscle strength was normal in all four extremities [Sensation Pain / Temperature Decrease] : pain and temperature was intact [Sensation Tactile Decrease] : light touch was intact [Over the Past 2 Weeks, Have You Felt Little Interest or Pleasure Doing Things?] : 2.) Over the past 2 weeks, have you felt little interest or pleasure doing things? No [Over the Past 2 Weeks, Have You Felt Down, Depressed, or Hopeless?] : 1.) Over the past 2 weeks, have you felt down, depressed, or hopeless? No [FreeTextEntry1] : lethargic initially during visit.  [FreeTextEntry6] : UE and LE 5/5 with encouragement bilaterally; no focal deficit [FreeTextEntry8] : presents in a wheelchair but can ambulate with assistance of a walker' + difficulty with weight-bearing

## 2019-12-19 NOTE — ASSESSMENT
[FreeTextEntry1] : Mr. Franco presents with above history and imaging (performed at [facility]).  He has no focal deficit at the time of today's visit.  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  The patient and his wife know to call the office with any new or concerning symptoms in the interim.

## 2021-08-23 NOTE — COUNSELING
Received referral.  Mailed letter and questionnaire to patient.      [Weight management counseling provided] : Weight management [Healthy eating counseling provided] : healthy eating [Activity counseling provided] : activity

## 2022-08-06 NOTE — PATIENT PROFILE ADULT - NSPROSPIRITUALVALUESFT_GEN_A_NUR
Pt will increase static/dynamic sitting balance to good  to perform all functional mobility without LOB, by 2weeks. NONE

## 2022-11-25 NOTE — ED PROVIDER NOTE - NS_BEDUNITTYPES_ED_ALL_ED
Royce Rene has called the [facility] office requesting a refill of atorvastatin (LIPITOR) 80 mg tablet  Requested refill to be sent to the [facility] on [address]  ANY BED

## 2023-03-21 NOTE — DISCHARGE NOTE NURSING/CASE MANAGEMENT/SOCIAL WORK - NSDCPEPTSTROKESIGNS_GEN_ALL_CORE
Sudden numbness or weakness of the face, arm, or leg, especially on one side of the body. Confusion, trouble speaking or understanding. Trouble seeing in one or both eyes. Trouble walking, dizziness, loss of balance or coordination. Severe headache.
For information on Fall & Injury Prevention, visit: [email or web address] OR  [email or web address] OR  https://www.cdc.gov/steadi/patient.html

## 2023-03-31 NOTE — PATIENT PROFILE ADULT - NSPROPASSIVESMOKEEXPOSURE_GEN_A_NUR
PAST MEDICAL HISTORY:  Aspiration pneumonia at age 27    BPH without obstruction/lower urinary tract symptoms     Chronic GERD     Elevated liver enzymes     Environmental Allergies     Epilepsy     Glaucoma on eye gtts    Glaucoma     Hiatal hernia     Hydrocephalus age 2 months    Impulse control disorder worsened when pt on Depakote, pt now off    Legally blind     Mentally disabled     Mild mental retardation     Osteoporosis     Sleep apnea not on CPAP    Viral pneumonia h/o    
No

## 2025-02-13 NOTE — HEALTH RISK ASSESSMENT
[No falls in past year] : Patient reported no falls in the past year [0] : 2) Feeling down, depressed, or hopeless: Not at all (0) [] : No show